# Patient Record
Sex: FEMALE | Race: WHITE | Employment: UNEMPLOYED
[De-identification: names, ages, dates, MRNs, and addresses within clinical notes are randomized per-mention and may not be internally consistent; named-entity substitution may affect disease eponyms.]

---

## 2017-06-24 ENCOUNTER — HEALTH MAINTENANCE LETTER (OUTPATIENT)
Age: 42
End: 2017-06-24

## 2017-10-03 ENCOUNTER — HOSPITAL ENCOUNTER (OUTPATIENT)
Dept: MAMMOGRAPHY | Facility: CLINIC | Age: 42
Discharge: HOME | End: 2017-10-03
Attending: OBSTETRICS & GYNECOLOGY | Admitting: OBSTETRICS & GYNECOLOGY
Payer: COMMERCIAL

## 2017-10-03 DIAGNOSIS — Z12.31 VISIT FOR SCREENING MAMMOGRAM: ICD-10-CM

## 2017-10-03 PROCEDURE — G0202 SCR MAMMO BI INCL CAD: HCPCS

## 2017-10-27 ENCOUNTER — TELEPHONE (OUTPATIENT)
Dept: CARDIOLOGY | Facility: CLINIC | Age: 42
End: 2017-10-27

## 2017-10-27 DIAGNOSIS — R00.2 PALPITATIONS: Primary | ICD-10-CM

## 2017-10-27 NOTE — TELEPHONE ENCOUNTER
Call from patient, she states for the past week on 3-4 mornings she has been awakened from a deep sleep (anywhere from 4-6 AM) with a rapid heart rate. She denies any chest discomfort, dyspnea or lightheadedness with this. She states the rate lasts 10-30 minutes, she just reads and rests until it goes away. She wonders if she needs a monitor for this. Patient is scheduled for annual visit 11/21/17 with Dr. Ortiz, having an echo 11/13/17 to monitor known aortic stenosis. Reviewed with patient to seek ER assessment if symptoms progress to chest discomfort, dyspnea or lightheadedness. Patient verbalized understanding and agreed with plan.  Will message Dr. Ortiz to review

## 2017-10-27 NOTE — TELEPHONE ENCOUNTER
Contacted patient with Dr. Ortiz's recommendation for 48 hr holter, connected patient to scheduling. Order placed for 48hr holter

## 2017-10-31 ENCOUNTER — PRE VISIT (OUTPATIENT)
Dept: CARDIOLOGY | Facility: CLINIC | Age: 42
End: 2017-10-31

## 2017-11-01 ENCOUNTER — HOSPITAL ENCOUNTER (OUTPATIENT)
Dept: CARDIOLOGY | Facility: CLINIC | Age: 42
Discharge: HOME | End: 2017-11-01
Attending: INTERNAL MEDICINE | Admitting: INTERNAL MEDICINE
Payer: COMMERCIAL

## 2017-11-01 DIAGNOSIS — R00.2 PALPITATIONS: ICD-10-CM

## 2017-11-01 PROCEDURE — 93226 XTRNL ECG REC<48 HR SCAN A/R: CPT | Performed by: INTERNAL MEDICINE

## 2017-11-01 PROCEDURE — 93227 XTRNL ECG REC<48 HR R&I: CPT | Performed by: INTERNAL MEDICINE

## 2017-11-13 ENCOUNTER — DOCUMENTATION ONLY (OUTPATIENT)
Dept: CARDIOLOGY | Facility: CLINIC | Age: 42
End: 2017-11-13

## 2017-11-13 ENCOUNTER — HOSPITAL ENCOUNTER (OUTPATIENT)
Dept: CARDIOLOGY | Facility: CLINIC | Age: 42
Discharge: HOME | End: 2017-11-13
Attending: INTERNAL MEDICINE | Admitting: INTERNAL MEDICINE
Payer: COMMERCIAL

## 2017-11-13 PROCEDURE — 93306 TTE W/DOPPLER COMPLETE: CPT | Mod: 26 | Performed by: INTERNAL MEDICINE

## 2017-11-13 PROCEDURE — 93306 TTE W/DOPPLER COMPLETE: CPT

## 2017-11-13 NOTE — PROGRESS NOTES
Echo results noted 11/13/17:  The aortic valve is not well visualized.  Moderate valvular aortic stenosis.  No aortic regurgitation is present.  Left ventricular systolic function is normal.  In comparison to previous study, 4/2015, aortic gradients have increased  Slightly.  Will message Dr. Ortiz for review.

## 2017-11-21 ENCOUNTER — OFFICE VISIT (OUTPATIENT)
Dept: CARDIOLOGY | Facility: CLINIC | Age: 42
End: 2017-11-21
Attending: INTERNAL MEDICINE
Payer: COMMERCIAL

## 2017-11-21 VITALS
DIASTOLIC BLOOD PRESSURE: 84 MMHG | BODY MASS INDEX: 31.65 KG/M2 | HEART RATE: 66 BPM | WEIGHT: 190 LBS | SYSTOLIC BLOOD PRESSURE: 140 MMHG | HEIGHT: 65 IN

## 2017-11-21 DIAGNOSIS — I35.0 NONRHEUMATIC AORTIC VALVE STENOSIS: ICD-10-CM

## 2017-11-21 DIAGNOSIS — I35.9 AORTIC VALVE DISORDER: Primary | ICD-10-CM

## 2017-11-21 DIAGNOSIS — I10 BENIGN ESSENTIAL HYPERTENSION: ICD-10-CM

## 2017-11-21 PROCEDURE — 99213 OFFICE O/P EST LOW 20 MIN: CPT | Performed by: INTERNAL MEDICINE

## 2017-11-21 NOTE — LETTER
11/21/2017    Maren Robles PA-C  [address]  [facility] [address]    RE: Mo Castaneda       Dear Colleague,    I had the pleasure of seeing Mo Castaneda in the [facility].    Mo is a very nice 42-year-old woman with past medical history significant for a bicuspid aortic valve with mild aortic stenosis.  She does have a son who has a subvalvular membrane.  We performed an MRI which demonstrates mild ectasia.  Aorta measured 36 for by 35 mm.  She had no subaortic membrane.  Transesophageal echocardiogram in 03/2013 confirmed the bicuspid aortic valve.  Again, no evidence of supravalvular or subvalvular membrane.      Mo returns to clinic stating she is doing quite well.  She has no chest, arm, neck, jaw or shoulder discomfort.  No dyspnea on exertion, orthopnea or PND.  No palpitations, lightheadedness, dizziness, syncope or near-syncope.  No ankle edema.  Overall, she feels she is doing quite well.      She does have some concerns about high blood pressures as it runs in her family.     Outpatient Encounter Prescriptions as of 11/21/2017   Medication Sig Dispense Refill     Multiple Vitamin (MULTIVITAMINS PO) Take 1 tablet by mouth daily.       Cholecalciferol (VITAMIN D3 PO) Take 1,000 Units by mouth daily.       No facility-administered encounter medications on file as of 11/21/2017.       ASSESSMENT AND PLAN:  Mo appears to be doing quite well from a cardiac standpoint without clinical evidence of ischemia, heart failure or significant arrhythmia.      A repeat echocardiogram performed earlier this month shows an ejection fraction of 60%-65%.  Mean gradient across the valve is now 24 mmHg with a peak of 41 mmHg, giving an estimated aortic valve area 1.1.  Mean gradient is up from 18 in 2015.      Blood pressure is high today at 140/84, although all other blood pressures are in the 120-130 range.  We talked about the recent recommendations that high  blood pressure is now in the 130s.  We also talked about nonmedicinal things to treat her blood pressure including regular exercise, weight loss, low salt diet, a diet high in fresh fruits and vegetables and fiber.  I have told her to check her blood pressure periodically on her own.  If it does stay high, then we do need to treat it.  It does run in her family, so she is concerned.      I will repeat the echocardiogram in 2 years.  I will have her follow up with my JOANIE in 1 year.  If she should have any problems, I would be glad to see her sooner.        Thank you for allowing me to participate in her care.     Sincerely,    Jb Ortiz MD     [facility]

## 2017-11-21 NOTE — PROGRESS NOTES
HPI and Plan:   See dictation    Orders Placed This Encounter   Procedures     Follow-Up with Cardiac Advanced Practice Provider     Follow-Up with Cardiologist       No orders of the defined types were placed in this encounter.      There are no discontinued medications.      Encounter Diagnoses   Name Primary?     Aortic valve disorder Yes     Nonrheumatic aortic valve stenosis      Benign essential hypertension        CURRENT MEDICATIONS:  Current Outpatient Prescriptions   Medication Sig Dispense Refill     Multiple Vitamin (MULTIVITAMINS PO) Take 1 tablet by mouth daily.       Cholecalciferol (VITAMIN D3 PO) Take 1,000 Units by mouth daily.         ALLERGIES     Allergies   Allergen Reactions     No Known Drug Allergy        PAST MEDICAL HISTORY:  Past Medical History:   Diagnosis Date     Aortic stenosis 2015     Aortic valve disorders     Bicuspid aortic valve     Congenital insufficiency of aortic valve-BICUSPID 2015      (normal spontaneous vaginal delivery)     x1     PIH (pregnancy induced hypertension)     delivered twins at 37 weeks       PAST SURGICAL HISTORY:  Past Surgical History:   Procedure Laterality Date     C/SECTION, CLASSICAL         FAMILY HISTORY:  Family History   Problem Relation Age of Onset     Lupus Father      Other - See Comments Father 63     gallbladder issues     HEART DISEASE Paternal Grandfather      valve     Other - See Comments Paternal Grandfather      menieres     Other - See Comments Paternal Grandmother      MG     Thyroid Disease Paternal Grandmother      Other - See Comments Paternal Aunt      RA       SOCIAL HISTORY:  Social History     Social History     Marital status:      Spouse name: N/A     Number of children: N/A     Years of education: N/A     Social History Main Topics     Smoking status: Never Smoker     Smokeless tobacco: Never Used     Alcohol use Yes      Comment: occasional     Drug use: None     Sexual activity: Not Asked     Other  "Topics Concern     Parent/Sibling W/ Cabg, Mi Or Angioplasty Before 65f 55m? No     Caffeine Concern No     Sleep Concern No     Special Diet No     Exercise Yes     treadmill daily, biking     Bike Helmet Yes     Seat Belt Yes     Social History Narrative    , has 11yo son and 8.6 yo twins     Homemaker.       Review of Systems:  Skin:  Negative       Eyes:  Negative      ENT:  Negative      Respiratory:  Negative       Cardiovascular:  Negative palpitations;Positive for;chest pain    Gastroenterology: Negative      Genitourinary:  Negative      Musculoskeletal:  Negative      Neurologic:  Negative      Psychiatric:  Negative      Heme/Lymph/Imm:  Negative      Endocrine:  Negative        Physical Exam:  Vitals: /84  Pulse 66  Ht 1.651 m (5' 5\")  Wt 86.2 kg (190 lb)  BMI 31.62 kg/m2    Constitutional:  cooperative, alert and oriented, well developed, well nourished, in no acute distress overweight      Skin:  warm and dry to the touch, no apparent skin lesions or masses noted          Head:  normocephalic, no masses or lesions        Eyes:  pupils equal and round;conjunctivae and lids unremarkable;EOMS intact        Lymph:      ENT:  no pallor or cyanosis, dentition good        Neck:  carotid pulses are full and equal bilaterally        Respiratory:  normal breath sounds, clear to auscultation, normal A-P diameter, normal symmetry, normal respiratory excursion, no use of accessory muscles         Cardiac: regular rhythm       early systolic murmur;grade 2;RUSB;radiation to the carotid        pulses full and equal                                        GI:           Extremities and Muscular Skeletal:  no edema;no spinal abnormalities noted;normal muscle strength and tone              Neurological:  no gross motor deficits;affect appropriate        Psych:  affect appropriate, oriented to time, person and place        CC  Jb Ortiz MD  [address]  [place] [id number]              "

## 2017-11-21 NOTE — PROGRESS NOTES
HISTORY OF PRESENT ILLNESS:  Mo is a very nice 42-year-old woman with past medical history significant for a bicuspid aortic valve with mild aortic stenosis.  She does have a son who has a subvalvular membrane.  We performed an MRI which demonstrates mild ectasia.  Aorta measured 36 for by 35 mm.  She had no subaortic membrane.  Transesophageal echocardiogram in 03/2013 confirmed the bicuspid aortic valve.  Again, no evidence of supravalvular or subvalvular membrane.      Mo returns to clinic stating she is doing quite well.  She has no chest, arm, neck, jaw or shoulder discomfort.  No dyspnea on exertion, orthopnea or PND.  No palpitations, lightheadedness, dizziness, syncope or near-syncope.  No ankle edema.  Overall, she feels she is doing quite well.      She does have some concerns about high blood pressures as it runs in her family.      ASSESSMENT AND PLAN:  Mo appears to be doing quite well from a cardiac standpoint without clinical evidence of ischemia, heart failure or significant arrhythmia.      A repeat echocardiogram performed earlier this month shows an ejection fraction of 60%-65%.  Mean gradient across the valve is now 24 mmHg with a peak of 41 mmHg, giving an estimated aortic valve area 1.1.  Mean gradient is up from 18 in 2015.      Blood pressure is high today at 140/84, although all other blood pressures are in the 120-130 range.  We talked about the recent recommendations that high blood pressure is now in the 130s.  We also talked about nonmedicinal things to treat her blood pressure including regular exercise, weight loss, low salt diet, a diet high in fresh fruits and vegetables and fiber.  I have told her to check her blood pressure periodically on her own.  If it does stay high, then we do need to treat it.  It does run in her family, so she is concerned.      I will repeat the echocardiogram in 2 years.  I will have her follow up with my JOANIE in 1 year.  If she should have any  problems, I would be glad to see her sooner.        Thank you for allowing me to participate in her care.         SHELIA FRANCISCO MD, [facility]             D: 2017 16:29   T: 2017 17:51   MT: ANASTASIA      Name:     SON ZUNIGA   MRN:      [id number]        Account:      [id number]   :      1975           Service Date: 2017      Document: [id number]

## 2017-11-21 NOTE — MR AVS SNAPSHOT
"              After Visit Summary   11/21/2017    Mo Castaneda    MRN: [id number]           Patient Information     Date Of Birth          1975        Visit Information        Provider Department      11/21/2017 3:45 PM Jb Ortiz MD [facility]        Today's Diagnoses     Aortic valve disorder    -  1    Nonrheumatic aortic valve stenosis        Benign essential hypertension           Follow-ups after your visit        Additional Services     Follow-Up with Cardiac Advanced Practice Provider           Follow-Up with Cardiologist       With an echocardiogram                  Future tests that were ordered for you today     Open Future Orders        Priority Expected Expires Ordered    Follow-Up with Cardiac Advanced Practice Provider Routine 11/21/2018 11/22/2018 11/21/2017    Follow-Up with Cardiologist Routine 11/21/2019 12/11/2019 11/21/2017            Who to contact     If you have questions or need follow up information about today's clinic visit or your schedule please contact [facility] directly at [phone].  Normal or non-critical lab and imaging results will be communicated to you by [employer], letter or phone within 4 business days after the clinic has received the results. If you do not hear from us within 7 days, please contact the clinic through [employer] or phone. If you have a critical or abnormal lab result, we will notify you by phone as soon as possible.  Submit refill requests through [employer] or call your pharmacy and they will forward the refill request to us. Please allow 3 business days for your refill to be completed.          Additional Information About Your Visit        [employer] Information     [employer] lets you send messages to your doctor, view your test results, renew your prescriptions, schedule appointments and more. To sign up, go to [email or web address] . Click on \"Log in\" on the left " "side of the screen, which will take you to the Welcome page. Then click on \"Sign up Now\" on the right side of the page.     You will be asked to enter the access code listed below, as well as some personal information. Please follow the directions to create your username and password.     Your access code is: [email or web address]  Expires: 2018  4:29 PM     Your access code will  in 90 days. If you need help or a new code, please call your [facility] clinic or [phone].        Care EveryWhere ID     This is your Care EveryWhere ID. This could be used by other organizations to access your [facility] medical records  [id number]        Your Vitals Were     Pulse Height BMI (Body Mass Index)             66 1.651 m (5' 5\") 31.62 kg/m2          Blood Pressure from Last 3 Encounters:   17 140/84   16 124/74   05/12/15 136/84    Weight from Last 3 Encounters:   17 86.2 kg (190 lb)   16 82.1 kg (181 lb)   05/12/15 87.1 kg (192 lb)              We Performed the Following     Follow-Up with Cardiologist        Primary Care Provider Office Phone # Fax #    Maren Robles PA-C [phone] [address] [facility] [address]  [facility] [address]        Equal Access to Services     [facility]: Hadii aad ku hadasho Soomaali, waaxda luqadaha, qaybta kaalmada adeegyada, carla pickering haylatosha [place] . So [facility] [phone].    ATENCIÓN: Si habla español, tiene a kong disposición servicios gratuitos de asistencia lingüística. Llame al [phone].    We comply with applicable federal civil rights laws and [place] laws. We do not discriminate on the basis of race, color, national origin, age, disability, sex, sexual orientation, or gender identity.            Thank you!     Thank you for choosing [facility]  for your care. Our goal is always to provide you with excellent care. Hearing back from our patients is one way we can continue to improve our " services. Please take a few minutes to complete the written survey that you may receive in the mail after your visit with us. Thank you!             Your Updated Medication List - Protect others around you: Learn how to safely use, store and throw away your medicines at www.disposemymeds.org.          This list is accurate as of: 11/21/17  4:29 PM.  Always use your most recent med list.                   Brand Name Dispense Instructions for use Diagnosis    MULTIVITAMINS PO      Take 1 tablet by mouth daily.        VITAMIN D3 PO      Take 1,000 Units by mouth daily.

## 2020-02-13 ENCOUNTER — TELEPHONE (OUTPATIENT)
Dept: CARDIOLOGY | Facility: CLINIC | Age: 45
End: 2020-02-13

## 2020-02-13 DIAGNOSIS — I35.0 AORTIC STENOSIS: Primary | ICD-10-CM

## 2020-02-13 NOTE — TELEPHONE ENCOUNTER
Message to Dr Ortiz- OK to order echo? Pt overdue for cardiology f/up, last visit 11/2017 mentioned surveillance echo every two years for bicuspid aortic valve.

## 2020-02-24 ENCOUNTER — HOSPITAL ENCOUNTER (OUTPATIENT)
Dept: MAMMOGRAPHY | Facility: CLINIC | Age: 45
Discharge: HOME | End: 2020-02-24
Attending: OBSTETRICS & GYNECOLOGY | Admitting: OBSTETRICS & GYNECOLOGY
Payer: COMMERCIAL

## 2020-02-24 DIAGNOSIS — Z12.31 VISIT FOR SCREENING MAMMOGRAM: ICD-10-CM

## 2020-02-24 PROCEDURE — 77067 SCR MAMMO BI INCL CAD: CPT

## 2020-03-13 ENCOUNTER — HOSPITAL ENCOUNTER (OUTPATIENT)
Dept: CARDIOLOGY | Facility: CLINIC | Age: 45
Discharge: HOME | End: 2020-03-13
Attending: INTERNAL MEDICINE | Admitting: INTERNAL MEDICINE
Payer: COMMERCIAL

## 2020-03-13 ENCOUNTER — DOCUMENTATION ONLY (OUTPATIENT)
Dept: CARDIOLOGY | Facility: CLINIC | Age: 45
End: 2020-03-13

## 2020-03-13 DIAGNOSIS — I77.810 ASCENDING AORTA DILATATION (H): ICD-10-CM

## 2020-03-13 DIAGNOSIS — I35.0 AORTIC STENOSIS: ICD-10-CM

## 2020-03-13 PROCEDURE — 93306 TTE W/DOPPLER COMPLETE: CPT | Mod: 26 | Performed by: INTERNAL MEDICINE

## 2020-03-13 PROCEDURE — 93306 TTE W/DOPPLER COMPLETE: CPT

## 2020-03-13 NOTE — PROGRESS NOTES
"Echo 3/13/2020 noted. Ordered for 2 year follow up of known mild aortic stenosis. Patient is overdue for follow up and has not seen cardiology or PCP since 11/2017.  Patient to see Dr. Ortiz on 4/28/2020.    Echo: Left ventricular systolic function is normal. The visual ejection fraction is estimated at 60-65%. Moderate to severe valvular aortic stenosis. The ascending aorta is Mildly dilated. 4.0cm. Compared to prior study, changes are noted.  VIPIN = 1.1cm2 (same as 2017)  Ao mean GP: 34.8 mmHg (was 23 in 2017)    Attempted to contact patient for symptom update, left a message for patient to call back.    Will message Dr. Ortiz to review    1540 patient called back. She states she is feeling well, no fatigue, no SOB, no lightheadedness. She states that occasionally she wakes up at night with a fast heart rate but this resolves once she is up. Occasionally she feels \"twinges\" in her breast and/or center chest.     3/16/2020 reply from Dr. Ortiz:  Thank you        "

## 2020-04-15 ENCOUNTER — PRE VISIT (OUTPATIENT)
Dept: CARDIOLOGY | Facility: CLINIC | Age: 45
End: 2020-04-15

## 2020-04-28 ENCOUNTER — VIRTUAL VISIT (OUTPATIENT)
Dept: CARDIOLOGY | Facility: CLINIC | Age: 45
End: 2020-04-28
Attending: INTERNAL MEDICINE
Payer: COMMERCIAL

## 2020-04-28 DIAGNOSIS — I77.810 ASCENDING AORTA DILATATION (H): ICD-10-CM

## 2020-04-28 DIAGNOSIS — I35.0 NONRHEUMATIC AORTIC VALVE STENOSIS: Primary | ICD-10-CM

## 2020-04-28 DIAGNOSIS — Q23.81 BICUSPID AORTIC VALVE: ICD-10-CM

## 2020-04-28 PROCEDURE — 99213 OFFICE O/P EST LOW 20 MIN: CPT | Mod: 95 | Performed by: INTERNAL MEDICINE

## 2020-04-28 NOTE — LETTER
4/28/2020      RE: Mo Castaneda  [address] [place] [address]       Dear Colleague,    Thank you for the opportunity to participate in the care of your patient, Mo Castaneda, at the [facility]. Please see a copy of my visit note below.    HISTORY OF PRESENT ILLNESS:  Mo is a very nice 45-year-old woman with past medical history significant for a bicuspid aortic valve with mild aortic stenosis.  She does have a son who has a subvalvular membrane.  We performed an MRI which demonstrates mild ectasia.  Aorta measured 36 for by 35 mm.  She had no subaortic membrane.  Transesophageal echocardiogram in 03/2013 confirmed the bicuspid aortic valve.  Again, no evidence of supravalvular or subvalvular membrane.     Mo relates that her son is potentially scheduled for surgery for subaortic membrane on June 1.    She states she has no symptoms to suggest a Covid infection.     I had a video visit with Mo today.  She states she works out almost daily either doing her stairmaster or 1 hour walk with her .  Neither cause her any symptoms.      She does have some concerns about high blood pressures as it runs in her family.  We rechecked her blood pressure today and was 124/77.  She states the vast majority of time her blood pressure is in the 120s.  Occasionally she has a spike.    General:  no apparent distress, normal body habitus, sitting upright.  ENT/Mouth:  membranes moist, no nasal discharge.  Normal head shape, no apparent injury or laceration.  Eyes:  no scleral icterus, normal conjunctivae.  No observed jaundice.  Neck:  no apparent neck swelling.   Chest/Lungs:  No breathing difficulty while speaking.  No audible wheezing.  No cough during conversation.  Cardiovascular:  No obviously elevated jugular venous pressure.    Extremities:  no apparent cyanosis.  Skin:  no xanthelasma.  No facial  lacerations.  Neurologic:  Normal arm motion bilateral, no tremors.    Psychiatric:  Alert and oriented x3, calm demeanor    The rest of the comprehensive physical examination is deferred due to public health emergency video visit restrictions.      ASSESSMENT AND PLAN:  Mo appears to be doing quite well from a cardiac standpoint without clinical evidence of ischemia, heart failure or significant arrhythmia.      Repeat echocardiogram shows progression of her aortic stenosis.  Mean gradient is now 35 with a peak gradient of 53.  Her aortic valve area is 1.1 with a dimensionless index of 0.36.  Aortic root is measured at 4.0 by echocardiogram.  I will start following her echocardiograms on an annual basis.  We discussed the importance of watching for any dyspnea on exertion, exertional chest pain or unexplained lightheadedness or dizziness     Initial blood pressure with the MA was quite high although states she was nervous and anxious about the video visit.  A repeat of her blood pressure is 124/77 and more typical with her routine checks.     We talked about things she could do to non-medicinally treated blood pressure including low-salt diet, diet high in fresh fruits and vegetables to increase her potassium and magnesium.  Continue her regular exercise and maintain ideal body weight.    Thank you for allowing me to participate in her care.     Please do not hesitate to contact me if you have any questions/concerns.     Sincerely,     Jb Ortiz MD

## 2020-04-28 NOTE — PROGRESS NOTES
"Mo Castaneda is a 45 year old female who is being evaluated via a billable video visit.      The patient has been notified of following:     \"This video visit will be conducted via a call between you and your physician/provider. We have found that certain health care needs can be provided without the need for an in-person physical exam.  This service lets us provide the care you need with a video conversation.  If a prescription is necessary we can send it directly to your pharmacy.  If lab work is needed we can place an order for that and you can then stop by our lab to have the test done at a later time.    Video visits are billed at different rates depending on your insurance coverage.  Please reach out to your insurance provider with any questions.    If during the course of the call the physician/provider feels a video visit is not appropriate, you will not be charged for this service.\"  BP: 161/99  HR: 83  Weight:  Gillian Dawkins LPN    Review Of Systems  Skin: NEGATIVE  Eyes:Ears/Nose/Throat: NEGATIVE  Respiratory: NEGATIVE  Cardiovascular:ache in chest, occ., heart racing at night  Gastrointestinal: NEGATIVE  Genitourinary:NEGATIVE   Musculoskeletal: NEGATIVE  Neurologic: NEGATIVE  Psychiatric: anxiety, stress - son having heart surgery this summer for sub aortic membrane  Hematologic/Lymphatic/Immunologic: NEGATIVE  Endocrine:  NEGATIVE    Patient has given verbal consent for Video visit? Yes  How would you like to obtain your AVS? Sondra  Patient would like the video invitation sent by: Text to cell phone: [phone]    Will anyone else be joining your video visit? No        Video-Visit Details    Type of service:  Video Visit    Video Start Time: 2:47 PM  Video End Time: 3:08 PM    Originating Location (pt. Location): Home    Distant Location (provider location):  [facility]     Mode of Communication:  Video Conference via [facility]    HISTORY OF PRESENT " ILLNESS:  Mo is a very nice 45-year-old woman with past medical history significant for a bicuspid aortic valve with mild aortic stenosis.  She does have a son who has a subvalvular membrane.  We performed an MRI which demonstrates mild ectasia.  Aorta measured 36 for by 35 mm.  She had no subaortic membrane.  Transesophageal echocardiogram in 03/2013 confirmed the bicuspid aortic valve.  Again, no evidence of supravalvular or subvalvular membrane.     Mo relates that her son is potentially scheduled for surgery for subaortic membrane on June 1.    She states she has no symptoms to suggest a Covid infection.     I had a video visit with Mo today.  She states she works out almost daily either doing her stairmaster or 1 hour walk with her .  Neither cause her any symptoms.      She does have some concerns about high blood pressures as it runs in her family.  We rechecked her blood pressure today and was 124/77.  She states the vast majority of time her blood pressure is in the 120s.  Occasionally she has a spike.    General:  no apparent distress, normal body habitus, sitting upright.  ENT/Mouth:  membranes moist, no nasal discharge.  Normal head shape, no apparent injury or laceration.  Eyes:  no scleral icterus, normal conjunctivae.  No observed jaundice.  Neck:  no apparent neck swelling.   Chest/Lungs:  No breathing difficulty while speaking.  No audible wheezing.  No cough during conversation.  Cardiovascular:  No obviously elevated jugular venous pressure.    Extremities:  no apparent cyanosis.  Skin:  no xanthelasma.  No facial lacerations.  Neurologic:  Normal arm motion bilateral, no tremors.    Psychiatric:  Alert and oriented x3, calm demeanor    The rest of the comprehensive physical examination is deferred due to public health emergency video visit restrictions.         ASSESSMENT AND PLAN:  Mo appears to be doing quite well from a cardiac standpoint without clinical evidence of  ischemia, heart failure or significant arrhythmia.      Repeat echocardiogram shows progression of her aortic stenosis.  Mean gradient is now 35 with a peak gradient of 53.  Her aortic valve area is 1.1 with a dimensionless index of 0.36.  Aortic root is measured at 4.0 by echocardiogram.  I will start following her echocardiograms on an annual basis.  We discussed the importance of watching for any dyspnea on exertion, exertional chest pain or unexplained lightheadedness or dizziness     Initial blood pressure with the MA was quite high although states she was nervous and anxious about the video visit.  A repeat of her blood pressure is 124/77 and more typical with her routine checks.     We talked about things she could do to non-medicinally treated blood pressure including low-salt diet, diet high in fresh fruits and vegetables to increase her potassium and magnesium.  Continue her regular exercise and maintain ideal body weight.      Thank you for allowing me to participate in her care.         JB FRANCISCO MD, [facility]        Jb Francisco MD

## 2020-06-18 ENCOUNTER — TRANSFERRED RECORDS (OUTPATIENT)
Dept: MULTI SPECIALTY CLINIC | Facility: CLINIC | Age: 45
End: 2020-06-18

## 2020-06-18 LAB — PAP SMEAR - HIM PATIENT REPORTED: NORMAL

## 2021-01-15 ENCOUNTER — HEALTH MAINTENANCE LETTER (OUTPATIENT)
Age: 46
End: 2021-01-15

## 2021-03-18 ENCOUNTER — MYC MEDICAL ADVICE (OUTPATIENT)
Dept: CARDIOLOGY | Facility: CLINIC | Age: 46
End: 2021-03-18

## 2021-03-24 ENCOUNTER — PRE VISIT (OUTPATIENT)
Dept: CARDIOLOGY | Facility: CLINIC | Age: 46
End: 2021-03-24

## 2021-03-25 ENCOUNTER — IMMUNIZATION (OUTPATIENT)
Dept: NURSING | Facility: CLINIC | Age: 46
End: 2021-03-25
Payer: COMMERCIAL

## 2021-03-25 PROCEDURE — 91300 PR COVID VAC PFIZER DIL RECON 30 MCG/0.3 ML IM: CPT

## 2021-03-25 PROCEDURE — 0001A PR COVID VAC PFIZER DIL RECON 30 MCG/0.3 ML IM: CPT

## 2021-03-30 ENCOUNTER — HOSPITAL ENCOUNTER (OUTPATIENT)
Dept: CARDIOLOGY | Facility: CLINIC | Age: 46
Discharge: HOME | End: 2021-03-30
Attending: INTERNAL MEDICINE | Admitting: INTERNAL MEDICINE
Payer: COMMERCIAL

## 2021-03-30 DIAGNOSIS — I35.0 NONRHEUMATIC AORTIC VALVE STENOSIS: ICD-10-CM

## 2021-03-30 PROCEDURE — 93306 TTE W/DOPPLER COMPLETE: CPT

## 2021-03-30 PROCEDURE — 93306 TTE W/DOPPLER COMPLETE: CPT | Mod: 26 | Performed by: INTERNAL MEDICINE

## 2021-04-06 ENCOUNTER — OFFICE VISIT (OUTPATIENT)
Dept: CARDIOLOGY | Facility: CLINIC | Age: 46
End: 2021-04-06
Attending: INTERNAL MEDICINE
Payer: COMMERCIAL

## 2021-04-06 VITALS
BODY MASS INDEX: 28.16 KG/M2 | WEIGHT: 169 LBS | HEART RATE: 77 BPM | SYSTOLIC BLOOD PRESSURE: 150 MMHG | DIASTOLIC BLOOD PRESSURE: 97 MMHG | HEIGHT: 65 IN

## 2021-04-06 DIAGNOSIS — I77.810 ASCENDING AORTA DILATATION (H): ICD-10-CM

## 2021-04-06 DIAGNOSIS — Q23.81 BICUSPID AORTIC VALVE: Primary | ICD-10-CM

## 2021-04-06 DIAGNOSIS — I35.0 NONRHEUMATIC AORTIC VALVE STENOSIS: ICD-10-CM

## 2021-04-06 PROCEDURE — 99213 OFFICE O/P EST LOW 20 MIN: CPT | Performed by: INTERNAL MEDICINE

## 2021-04-06 ASSESSMENT — MIFFLIN-ST. JEOR: SCORE: 1402.46

## 2021-04-06 NOTE — LETTER
2021    Maren Robles PA-C  [address]  [facility] [address]    RE: Mo Castaneda       Dear Colleague,    I had the pleasure of seeing Mo Castaneda in the [facility].    HPI and Plan:   See dictation    Orders Placed This Encounter   Procedures     Follow-Up with Cardiologist     Echocardiogram Complete       No orders of the defined types were placed in this encounter.      There are no discontinued medications.      Encounter Diagnoses   Name Primary?     Bicuspid aortic valve Yes     Nonrheumatic aortic valve stenosis      Ascending aorta dilatation (H)        CURRENT MEDICATIONS:  Current Outpatient Medications   Medication Sig Dispense Refill     Cholecalciferol (VITAMIN D3 PO) Take 1,000 Units by mouth daily.       Multiple Vitamin (MULTIVITAMINS PO) Take 1 tablet by mouth daily.         ALLERGIES     Allergies   Allergen Reactions     No Known Drug Allergy        PAST MEDICAL HISTORY:  Past Medical History:   Diagnosis Date     Aortic stenosis 2015     Aortic valve disorders     Bicuspid aortic valve     Ascending aorta dilatation (H)      Congenital insufficiency of aortic valve-BICUSPID 2015      (normal spontaneous vaginal delivery)     x1     PIH (pregnancy induced hypertension)     delivered twins at 37 weeks       PAST SURGICAL HISTORY:  Past Surgical History:   Procedure Laterality Date     C/SECTION, CLASSICAL         FAMILY HISTORY:  Family History   Problem Relation Age of Onset     Lupus Father      Other - See Comments Father 63        gallbladder issues     Hypertension Father      Heart Disease Paternal Grandfather         valve     Other - See Comments Paternal Grandfather         menieres     Other - See Comments Paternal Grandmother         MG     Thyroid Disease Paternal Grandmother      Other - See Comments Paternal Aunt         RA       SOCIAL HISTORY:  Social History     Socioeconomic History      Marital status:      Spouse name: None     Number of children: None     Years of education: None     Highest education level: None   Occupational History     None   Social Needs     Financial resource strain: None     Food insecurity     Worry: None     Inability: None     Transportation needs     Medical: None     Non-medical: None   Tobacco Use     Smoking status: Never Smoker     Smokeless tobacco: Never Used   Substance and Sexual Activity     Alcohol use: Yes     Comment: occasional     Drug use: None     Sexual activity: None   Lifestyle     Physical activity     Days per week: None     Minutes per session: None     Stress: None   Relationships     Social connections     Talks on phone: None     Gets together: None     Attends Alevism service: None     Active member of club or organization: None     Attends meetings of clubs or organizations: None     Relationship status: None     Intimate partner violence     Fear of current or ex partner: None     Emotionally abused: None     Physically abused: None     Forced sexual activity: None   Other Topics Concern     Parent/sibling w/ CABG, MI or angioplasty before 65F 55M? No      Service Not Asked     Blood Transfusions Not Asked     Caffeine Concern No     Occupational Exposure Not Asked     Hobby Hazards Not Asked     Sleep Concern No     Stress Concern Not Asked     Weight Concern Not Asked     Special Diet No     Back Care Not Asked     Exercise Yes     Comment: treadmill daily, biking     Bike Helmet Yes     Seat Belt Yes     Self-Exams Not Asked   Social History Narrative    , has 11yo son and 8.6 yo twins     Homemaker.       Review of Systems:  Skin:  Negative       Eyes:  Negative      ENT:  Negative      Respiratory:  Negative       Cardiovascular:    Positive for;chest pain;palpitations    Gastroenterology: Negative      Genitourinary:  Negative      Musculoskeletal:  Negative      Neurologic:  Negative      Psychiatric:   "Negative      Heme/Lymph/Imm:  Negative      Endocrine:  Negative        Physical Exam:  Vitals: BP (!) 150/97 (BP Location: Left arm, Patient Position: Sitting, Cuff Size: Adult Regular)   Pulse 77   Ht 1.651 m (5' 5\")   Wt 76.7 kg (169 lb)   BMI 28.12 kg/m      Constitutional:  cooperative, alert and oriented, well developed, well nourished, in no acute distress overweight      Skin:  warm and dry to the touch, no apparent skin lesions or masses noted          Head:  normocephalic, no masses or lesions        Eyes:  pupils equal and round, conjunctivae and lids unremarkable, sclera white, no xanthalasma, EOMS intact, no nystagmus        Lymph:      ENT:  no pallor or cyanosis, dentition good        Neck:  carotid pulses are full and equal bilaterally        Respiratory:  normal breath sounds, clear to auscultation, normal A-P diameter, normal symmetry, normal respiratory excursion, no use of accessory muscles         Cardiac: regular rhythm       grade 2;RUSB;radiation to the carotid;systolic murmur     mid peaking  pulses full and equal                                        GI:           Extremities and Muscular Skeletal:  no edema;no spinal abnormalities noted;normal muscle strength and tone              Neurological:  no gross motor deficits        Psych:  affect appropriate, oriented to time, person and place        Thank you for allowing me to participate in the care of your patient.      Sincerely,     Jb Ortiz MD     [facility]    cc:   Jb Ortiz MD  [address] [place] [id number]        "

## 2021-04-06 NOTE — PROGRESS NOTES
Service Date: 04/06/2021      CLINIC VISIT      HISTORY OF PRESENT ILLNESS:  Mo is a very nice 45-year-old woman with past medical history significant for a bicuspid aortic valve with mild aortic stenosis.  She has a son who, just this past June, underwent surgery for his subvalvular membrane.  Mo had a TALHA in 2013 confirming her bicuspid aortic valve and again documenting that she has no subvalvular membrane.  She also had an MRI at that time, also demonstrating no evidence of subaortic membrane and her ascending aorta was 3.6 x 3.5.      Echocardiogram last year appeared to progress to the point that her mean gradient across the valve was 35 and ascending aorta was 4.0.  I decided to start checking her echos on an annual basis.      Mo returns to clinic stating she is doing great.  She states she probably exercises about 2-1/2 hours a day.  She walks her dogs for an hour and a half, then oftentimes will do her own workout for at least a half hour to an hour and she has no symptoms at all.  She states she occasionally gets a chest discomfort.  This occurs at rest and it is most closely associated with stress.  She states she never has any chest discomfort or limitations with her activities.      ASSESSMENT AND PLAN:  Mo appears to be doing well from a cardiac standpoint without clinical evidence of ischemia, heart failure or significant arrhythmia.      Echocardiogram this year estimates ejection fraction of 60%-65%, mean gradient of 25, aortic valve area of 0.97, dimensionless index of 0.34.  Her ascending aorta is estimated to be 3.9.  Although these numbers are similar or better than last year's numbers, I told her we will repeat the echocardiogram again next year.  If numbers are in a similar range, I may extend back out to doing echos every 2 years.      We talked a bit about TAVR, about options of tissue valve versus metal valve and I have told her this is a rapidly evolving area and at this  time, we are just going to continue to follow her by symptoms and serial echocardiograms.      I have told her that her chest pain is quite atypical.  I do not think it represents angina nor do I think it is related to her aortic valve.  We will plan on visiting again next year.  If she should have any problems, I would be glad to see her sooner.      Thank you for allowing me to participate in her care.      Jb Francisco MD, FACC         JB FRANCISCO MD, FACC             D: 2021   T: 2021   MT: BALAJI      Name:     SON ZUNIGA   MRN:      [id number]        Account:      [id number]   :      1975           Service Date: 2021      Document: [id number]

## 2021-04-06 NOTE — PROGRESS NOTES
HPI and Plan:   See dictation    Orders Placed This Encounter   Procedures     Follow-Up with Cardiologist     Echocardiogram Complete       No orders of the defined types were placed in this encounter.      There are no discontinued medications.      Encounter Diagnoses   Name Primary?     Bicuspid aortic valve Yes     Nonrheumatic aortic valve stenosis      Ascending aorta dilatation (H)        CURRENT MEDICATIONS:  Current Outpatient Medications   Medication Sig Dispense Refill     Cholecalciferol (VITAMIN D3 PO) Take 1,000 Units by mouth daily.       Multiple Vitamin (MULTIVITAMINS PO) Take 1 tablet by mouth daily.         ALLERGIES     Allergies   Allergen Reactions     No Known Drug Allergy        PAST MEDICAL HISTORY:  Past Medical History:   Diagnosis Date     Aortic stenosis 2015     Aortic valve disorders     Bicuspid aortic valve     Ascending aorta dilatation (H)      Congenital insufficiency of aortic valve-BICUSPID 2015      (normal spontaneous vaginal delivery)     x1     PIH (pregnancy induced hypertension)     delivered twins at 37 weeks       PAST SURGICAL HISTORY:  Past Surgical History:   Procedure Laterality Date     C/SECTION, CLASSICAL         FAMILY HISTORY:  Family History   Problem Relation Age of Onset     Lupus Father      Other - See Comments Father 63        gallbladder issues     Hypertension Father      Heart Disease Paternal Grandfather         valve     Other - See Comments Paternal Grandfather         menieres     Other - See Comments Paternal Grandmother         MG     Thyroid Disease Paternal Grandmother      Other - See Comments Paternal Aunt         RA       SOCIAL HISTORY:  Social History     Socioeconomic History     Marital status:      Spouse name: None     Number of children: None     Years of education: None     Highest education level: None   Occupational History     None   Social Needs     Financial resource strain: None     Food insecurity      "Worry: None     Inability: None     Transportation needs     Medical: None     Non-medical: None   Tobacco Use     Smoking status: Never Smoker     Smokeless tobacco: Never Used   Substance and Sexual Activity     Alcohol use: Yes     Comment: occasional     Drug use: None     Sexual activity: None   Lifestyle     Physical activity     Days per week: None     Minutes per session: None     Stress: None   Relationships     Social connections     Talks on phone: None     Gets together: None     Attends Sabianist service: None     Active member of club or organization: None     Attends meetings of clubs or organizations: None     Relationship status: None     Intimate partner violence     Fear of current or ex partner: None     Emotionally abused: None     Physically abused: None     Forced sexual activity: None   Other Topics Concern     Parent/sibling w/ CABG, MI or angioplasty before 65F 55M? No      Service Not Asked     Blood Transfusions Not Asked     Caffeine Concern No     Occupational Exposure Not Asked     Hobby Hazards Not Asked     Sleep Concern No     Stress Concern Not Asked     Weight Concern Not Asked     Special Diet No     Back Care Not Asked     Exercise Yes     Comment: treadmill daily, biking     Bike Helmet Yes     Seat Belt Yes     Self-Exams Not Asked   Social History Narrative    , has 9yo son and 8.4 yo twins     Homemaker.       Review of Systems:  Skin:  Negative       Eyes:  Negative      ENT:  Negative      Respiratory:  Negative       Cardiovascular:    Positive for;chest pain;palpitations    Gastroenterology: Negative      Genitourinary:  Negative      Musculoskeletal:  Negative      Neurologic:  Negative      Psychiatric:  Negative      Heme/Lymph/Imm:  Negative      Endocrine:  Negative        Physical Exam:  Vitals: BP (!) 150/97 (BP Location: Left arm, Patient Position: Sitting, Cuff Size: Adult Regular)   Pulse 77   Ht 1.651 m (5' 5\")   Wt 76.7 kg (169 lb)   BMI " 28.12 kg/m      Constitutional:  cooperative, alert and oriented, well developed, well nourished, in no acute distress overweight      Skin:  warm and dry to the touch, no apparent skin lesions or masses noted          Head:  normocephalic, no masses or lesions        Eyes:  pupils equal and round, conjunctivae and lids unremarkable, sclera white, no xanthalasma, EOMS intact, no nystagmus        Lymph:      ENT:  no pallor or cyanosis, dentition good        Neck:  carotid pulses are full and equal bilaterally        Respiratory:  normal breath sounds, clear to auscultation, normal A-P diameter, normal symmetry, normal respiratory excursion, no use of accessory muscles         Cardiac: regular rhythm       grade 2;RUSB;radiation to the carotid;systolic murmur     mid peaking  pulses full and equal                                        GI:           Extremities and Muscular Skeletal:  no edema;no spinal abnormalities noted;normal muscle strength and tone              Neurological:  no gross motor deficits        Psych:  affect appropriate, oriented to time, person and place        CC  Jb Ortiz MD  [address]  [place] [id number]

## 2021-04-15 ENCOUNTER — IMMUNIZATION (OUTPATIENT)
Dept: NURSING | Facility: CLINIC | Age: 46
End: 2021-04-15
Attending: INTERNAL MEDICINE
Payer: COMMERCIAL

## 2021-04-15 PROCEDURE — 91300 PR COVID VAC PFIZER DIL RECON 30 MCG/0.3 ML IM: CPT

## 2021-04-15 PROCEDURE — 0002A PR COVID VAC PFIZER DIL RECON 30 MCG/0.3 ML IM: CPT

## 2021-05-16 ENCOUNTER — HEALTH MAINTENANCE LETTER (OUTPATIENT)
Age: 46
End: 2021-05-16

## 2021-09-05 ENCOUNTER — HEALTH MAINTENANCE LETTER (OUTPATIENT)
Age: 46
End: 2021-09-05

## 2022-02-20 ENCOUNTER — HEALTH MAINTENANCE LETTER (OUTPATIENT)
Age: 47
End: 2022-02-20

## 2022-06-12 ENCOUNTER — HEALTH MAINTENANCE LETTER (OUTPATIENT)
Age: 47
End: 2022-06-12

## 2022-10-23 ENCOUNTER — HEALTH MAINTENANCE LETTER (OUTPATIENT)
Age: 47
End: 2022-10-23

## 2023-03-07 ENCOUNTER — OFFICE VISIT (OUTPATIENT)
Dept: FAMILY MEDICINE | Facility: CLINIC | Age: 48
End: 2023-03-07
Payer: COMMERCIAL

## 2023-03-07 VITALS
OXYGEN SATURATION: 100 % | WEIGHT: 172.3 LBS | RESPIRATION RATE: 16 BRPM | DIASTOLIC BLOOD PRESSURE: 84 MMHG | SYSTOLIC BLOOD PRESSURE: 124 MMHG | BODY MASS INDEX: 28.71 KG/M2 | TEMPERATURE: 97.6 F | HEIGHT: 65 IN | HEART RATE: 83 BPM

## 2023-03-07 DIAGNOSIS — Z12.11 SCREEN FOR COLON CANCER: ICD-10-CM

## 2023-03-07 DIAGNOSIS — Z11.4 SCREENING FOR HIV (HUMAN IMMUNODEFICIENCY VIRUS): ICD-10-CM

## 2023-03-07 DIAGNOSIS — R21 RASH AND NONSPECIFIC SKIN ERUPTION: ICD-10-CM

## 2023-03-07 DIAGNOSIS — Z12.4 CERVICAL CANCER SCREENING: ICD-10-CM

## 2023-03-07 DIAGNOSIS — Z23 NEED FOR VACCINATION: ICD-10-CM

## 2023-03-07 DIAGNOSIS — E55.9 VITAMIN D DEFICIENCY: ICD-10-CM

## 2023-03-07 DIAGNOSIS — K21.9 GASTROESOPHAGEAL REFLUX DISEASE, UNSPECIFIED WHETHER ESOPHAGITIS PRESENT: ICD-10-CM

## 2023-03-07 DIAGNOSIS — Z11.59 NEED FOR HEPATITIS C SCREENING TEST: ICD-10-CM

## 2023-03-07 DIAGNOSIS — Z00.00 ANNUAL PHYSICAL EXAM: Primary | ICD-10-CM

## 2023-03-07 DIAGNOSIS — Z13.220 SCREENING FOR HYPERLIPIDEMIA: ICD-10-CM

## 2023-03-07 LAB
ALBUMIN SERPL BCG-MCNC: 5.1 G/DL (ref 3.5–5.2)
ALP SERPL-CCNC: 58 U/L (ref 35–104)
ALT SERPL W P-5'-P-CCNC: 19 U/L (ref 10–35)
ANION GAP SERPL CALCULATED.3IONS-SCNC: 13 MMOL/L (ref 7–15)
AST SERPL W P-5'-P-CCNC: 30 U/L (ref 10–35)
BASOPHILS # BLD AUTO: 0 10E3/UL (ref 0–0.2)
BASOPHILS NFR BLD AUTO: 0 %
BILIRUB SERPL-MCNC: 0.7 MG/DL
BUN SERPL-MCNC: 11.1 MG/DL (ref 6–20)
CALCIUM SERPL-MCNC: 9.5 MG/DL (ref 8.6–10)
CHLORIDE SERPL-SCNC: 103 MMOL/L (ref 98–107)
CHOLEST SERPL-MCNC: 221 MG/DL
CREAT SERPL-MCNC: 0.82 MG/DL (ref 0.51–0.95)
DEPRECATED HCO3 PLAS-SCNC: 25 MMOL/L (ref 22–29)
EOSINOPHIL # BLD AUTO: 0.1 10E3/UL (ref 0–0.7)
EOSINOPHIL NFR BLD AUTO: 2 %
ERYTHROCYTE [DISTWIDTH] IN BLOOD BY AUTOMATED COUNT: 12.7 % (ref 10–15)
GFR SERPL CREATININE-BSD FRML MDRD: 87 ML/MIN/1.73M2
GLUCOSE SERPL-MCNC: 96 MG/DL (ref 70–99)
HCT VFR BLD AUTO: 39.1 % (ref 35–47)
HDLC SERPL-MCNC: 66 MG/DL
HGB BLD-MCNC: 12.9 G/DL (ref 11.7–15.7)
IMM GRANULOCYTES # BLD: 0 10E3/UL
IMM GRANULOCYTES NFR BLD: 0 %
LDLC SERPL CALC-MCNC: 144 MG/DL
LYMPHOCYTES # BLD AUTO: 1.1 10E3/UL (ref 0.8–5.3)
LYMPHOCYTES NFR BLD AUTO: 27 %
MCH RBC QN AUTO: 30.4 PG (ref 26.5–33)
MCHC RBC AUTO-ENTMCNC: 33 G/DL (ref 31.5–36.5)
MCV RBC AUTO: 92 FL (ref 78–100)
MONOCYTES # BLD AUTO: 0.4 10E3/UL (ref 0–1.3)
MONOCYTES NFR BLD AUTO: 10 %
NEUTROPHILS # BLD AUTO: 2.4 10E3/UL (ref 1.6–8.3)
NEUTROPHILS NFR BLD AUTO: 61 %
NONHDLC SERPL-MCNC: 155 MG/DL
PLATELET # BLD AUTO: 217 10E3/UL (ref 150–450)
POTASSIUM SERPL-SCNC: 3.9 MMOL/L (ref 3.4–5.3)
PROT SERPL-MCNC: 7.5 G/DL (ref 6.4–8.3)
RBC # BLD AUTO: 4.24 10E6/UL (ref 3.8–5.2)
SODIUM SERPL-SCNC: 141 MMOL/L (ref 136–145)
T4 FREE SERPL-MCNC: 1.14 NG/DL (ref 0.9–1.7)
TRIGL SERPL-MCNC: 55 MG/DL
TSH SERPL DL<=0.005 MIU/L-ACNC: 4.56 UIU/ML (ref 0.3–4.2)
WBC # BLD AUTO: 4 10E3/UL (ref 4–11)

## 2023-03-07 PROCEDURE — 90715 TDAP VACCINE 7 YRS/> IM: CPT | Performed by: PHYSICIAN ASSISTANT

## 2023-03-07 PROCEDURE — 80061 LIPID PANEL: CPT | Performed by: PHYSICIAN ASSISTANT

## 2023-03-07 PROCEDURE — 84439 ASSAY OF FREE THYROXINE: CPT | Performed by: PHYSICIAN ASSISTANT

## 2023-03-07 PROCEDURE — 36415 COLL VENOUS BLD VENIPUNCTURE: CPT | Performed by: PHYSICIAN ASSISTANT

## 2023-03-07 PROCEDURE — 90471 IMMUNIZATION ADMIN: CPT | Performed by: PHYSICIAN ASSISTANT

## 2023-03-07 PROCEDURE — 86803 HEPATITIS C AB TEST: CPT | Performed by: PHYSICIAN ASSISTANT

## 2023-03-07 PROCEDURE — 80050 GENERAL HEALTH PANEL: CPT | Performed by: PHYSICIAN ASSISTANT

## 2023-03-07 PROCEDURE — 87389 HIV-1 AG W/HIV-1&-2 AB AG IA: CPT | Performed by: PHYSICIAN ASSISTANT

## 2023-03-07 PROCEDURE — 99386 PREV VISIT NEW AGE 40-64: CPT | Mod: 25 | Performed by: PHYSICIAN ASSISTANT

## 2023-03-07 PROCEDURE — 99213 OFFICE O/P EST LOW 20 MIN: CPT | Mod: 25 | Performed by: PHYSICIAN ASSISTANT

## 2023-03-07 PROCEDURE — 90677 PCV20 VACCINE IM: CPT | Performed by: PHYSICIAN ASSISTANT

## 2023-03-07 PROCEDURE — 82306 VITAMIN D 25 HYDROXY: CPT | Performed by: PHYSICIAN ASSISTANT

## 2023-03-07 PROCEDURE — 90472 IMMUNIZATION ADMIN EACH ADD: CPT | Performed by: PHYSICIAN ASSISTANT

## 2023-03-07 RX ORDER — KETOCONAZOLE 20 MG/ML
SHAMPOO TOPICAL
Qty: 100 ML | Refills: 1 | Status: SHIPPED | OUTPATIENT
Start: 2023-03-07

## 2023-03-07 ASSESSMENT — PAIN SCALES - GENERAL: PAINLEVEL: NO PAIN (0)

## 2023-03-07 NOTE — NURSING NOTE
Prior to immunization administration, verified patients identity using patient s name and date of birth. Please see Immunization Activity for additional information.     Screening Questionnaire for Adult Immunization    Are you sick today?   No   Do you have allergies to medications, food, a vaccine component or latex?   No   Have you ever had a serious reaction after receiving a vaccination?   No   Do you have a long-term health problem with heart, lung, kidney, or metabolic disease (e.g., diabetes), asthma, a blood disorder, no spleen, complement component deficiency, a cochlear implant, or a spinal fluid leak?  Are you on long-term aspirin therapy?   Yes   Do you have cancer, leukemia, HIV/AIDS, or any other immune system problem?   No   Do you have a parent, brother, or sister with an immune system problem?   Yes   In the past 3 months, have you taken medications that affect  your immune system, such as prednisone, other steroids, or anticancer drugs; drugs for the treatment of rheumatoid arthritis, Crohn s disease, or psoriasis; or have you had radiation treatments?   No   Have you had a seizure, or a brain or other nervous system problem?   No   During the past year, have you received a transfusion of blood or blood    products, or been given immune (gamma) globulin or antiviral drug?   No   For women: Are you pregnant or is there a chance you could become       pregnant during the next month?   No   Have you received any vaccinations in the past 4 weeks?   No     Immunization questionnaire was positive for at least one answer.  Notified Provider.        Per orders of Alec Cardoso PA-C, injection of Tdap and PCV20 given by Mary Ramos MA. Patient instructed to remain in clinic for 15 minutes afterwards, and to report any adverse reaction to me immediately.       Screening performed by Mary Ramos MA on 3/7/2023 at 1:48 PM.

## 2023-03-07 NOTE — Clinical Note
Please abstract the following data from this visit with this patient into the appropriate field in Epic:Pap  Tests that can be patient reported without a hard copy:  pap smear done by this group [facility] on this date: 6/18/20

## 2023-03-07 NOTE — PROGRESS NOTES
SUBJECTIVE:   CC: Mo is an 47 year old who presents for preventive health visit.     Here today for a annual physical.  Has a number of years since her last.  Lives in [place].   with 3 boys.  1 is a first year at [employer].  Other 2 in high school.  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    History of a bicuspid aortic valve, nonrheumatic aortic valve stenosis, and a ascending aortic dilation.  Follows with cardiology.  Last appointment in 2021.  Seeing them upcoming in March.  History of whitecoat hypertension.  Elevated readings at today's visit.  Prior to arrival blood pressure at home 124/84.    Upcoming appointment with her OB/GYN. OGI.  Due for Pap.  Mammogram scheduled for tomorrow.    Exercises with walking her dogs for an hour and a half daily in addition to using the elliptical machine.  Asymptomatic with this.  Diet well-balanced.    Longstanding history of acid reflux.  Feels this in her upper throat as well as her epigastric region.  Made worse by red sauce/spicy foods.  Continues to worsen.  Uses Tums as needed.  Mother had a history of gastric bypass due to acid reflux.  Has never tried omeprazole or Pepcid.  He is due for colonoscopy, wonders if we could do a upper endoscopy for further evaluation.  Brother history of celiac disease.      Patient has been advised of split billing requirements and indicates understanding: Yes  History of Present Illness       Reason for visit:  General wellness check upShe consumes 0 sweetened beverage(s) daily.She exercises with enough effort to increase her heart rate 60 or more minutes per day.  She exercises with enough effort to increase her heart rate 6 days per week.   She is taking medications regularly.      Today's PHQ-2 Score:   PHQ-2 ( 1999 Pfizer) 3/7/2023   Q1: Little interest or pleasure in doing things 0   Q2: Feeling down, depressed or hopeless 0   PHQ-2 Score 0   Q1: Little interest or pleasure in doing things -   Q2:  Feeling down, depressed or hopeless -   PHQ-2 Score -       Have you ever done Advance Care Planning? (For example, a Health Directive, POLST, or a discussion with a medical provider or your loved ones about your wishes): No, advance care planning information given to patient to review.  Patient declined advance care planning discussion at this time.    Social History     Tobacco Use     Smoking status: Never     Smokeless tobacco: Never   Substance Use Topics     Alcohol use: Yes     Comment: occasional         Reviewed orders with patient.  Reviewed health maintenance and updated orders accordingly - Yes  Lab work is in process  Labs reviewed in EPIC  BP Readings from Last 3 Encounters:   03/07/23 124/84   04/06/21 (!) 150/97   11/21/17 140/84    Wt Readings from Last 3 Encounters:   03/07/23 78.2 kg (172 lb 4.8 oz)   04/06/21 76.7 kg (169 lb)   11/21/17 86.2 kg (190 lb)           Patient Active Problem List   Diagnosis     Aortic valve disorder     Nonrheumatic aortic valve stenosis     Benign essential hypertension     Ascending aorta dilatation (H)     Bicuspid aortic valve     Past Surgical History:   Procedure Laterality Date     C/SECTION, CLASSICAL         Social History     Tobacco Use     Smoking status: Never     Smokeless tobacco: Never   Substance Use Topics     Alcohol use: Yes     Comment: occasional     Family History   Problem Relation Age of Onset     Lupus Father      Other - See Comments Father 63        gallbladder issues     Hypertension Father      Celiac Disease Brother      Other - See Comments Paternal Grandmother         MG     Thyroid Disease Paternal Grandmother      Heart Disease Paternal Grandfather         valve     Other - See Comments Paternal Grandfather         menieres     Diverticulitis Paternal Grandfather      Other - See Comments Paternal Aunt         RA     Rheumatoid Arthritis Paternal Aunt          Current Outpatient Medications   Medication Sig Dispense Refill      Cholecalciferol (VITAMIN D3 PO) Take 1,000 Units by mouth daily.       ketoconazole (NIZORAL) 2 % external shampoo Apply 5 to 10 mL to wet scalp, lather, leave on 3 to 5 minutes, and rinse; apply twice weekly for 2 to 4 weeks. 100 mL 1     Multiple Vitamin (MULTIVITAMINS PO) Take 1 tablet by mouth daily.       Allergies   Allergen Reactions     No Known Drug Allergy      No lab results found.     Breast Cancer Screening: Upcoming mammo.     Pertinent mammograms are reviewed under the imaging tab.    History of abnormal Pap smear: Upcoming Pap     Reviewed and updated as needed this visit by clinical staff   Tobacco  Allergies  Meds    Surg Hx  Fam Hx          Reviewed and updated as needed this visit by Provider   Tobacco  Allergies  Meds    Surg Hx  Fam Hx         Past Medical History:   Diagnosis Date     Aortic stenosis 2015     Aortic valve disorders     Bicuspid aortic valve     Ascending aorta dilatation (H)      Congenital insufficiency of aortic valve-BICUSPID 2015      (normal spontaneous vaginal delivery)     x1     PIH (pregnancy induced hypertension)     delivered twins at 37 weeks      Past Surgical History:   Procedure Laterality Date     C/SECTION, CLASSICAL       OB History   No obstetric history on file.       Review of Systems  CONSTITUTIONAL: NEGATIVE for fever, chills, change in weight  INTEGUMENTARU/SKIN: NEGATIVE for worrisome rashes, moles or lesions  EYES: NEGATIVE for vision changes or irritation  ENT: NEGATIVE for ear, mouth and throat problems  RESP: NEGATIVE for significant cough or SOB  BREAST: NEGATIVE for masses, tenderness or discharge  CV: NEGATIVE for chest pain, palpitations or peripheral edema  GI: NEGATIVE for nausea, abdominal pain, heartburn, or change in bowel habits  : NEGATIVE for unusual urinary or vaginal symptoms. Periods are regular.  MUSCULOSKELETAL: NEGATIVE for significant arthralgias or myalgia  NEURO: NEGATIVE for weakness, dizziness or  "paresthesias  PSYCHIATRIC: NEGATIVE for changes in mood or affect     OBJECTIVE:   /84   Pulse 83   Temp 97.6  F (36.4  C) (Oral)   Resp 16   Ht 1.651 m (5' 5\")   Wt 78.2 kg (172 lb 4.8 oz)   LMP 02/26/2023 (Approximate)   SpO2 100%   Breastfeeding No   BMI 28.67 kg/m    Physical Exam  GENERAL: healthy, alert and no distress  EYES: Eyes grossly normal to inspection, PERRL and conjunctivae and sclerae normal  HENT: ear canals and TM's normal, nose and mouth without ulcers or lesions  NECK: no adenopathy, no asymmetry, masses, or scars and thyroid normal to palpation  RESP: lungs clear to auscultation - no rales, rhonchi or wheezes  CV: regular rate and rhythm, systolic murmur grade 2, no peripheral edema and peripheral pulses strong  ABDOMEN: soft, nontender, no hepatosplenomegaly, no masses and bowel sounds normal  MS: no gross musculoskeletal defects noted, no edema  SKIN: Erythematous patch posterior scalp.  Dry/scaling.  No additional suspicious lesions or rashes.   NEURO: Normal strength and tone, mentation intact and speech normal  PSYCH: mentation appears normal, affect normal/bright    ASSESSMENT/PLAN:   Mo was seen today for gastrointestinal problem, physical and gastrophageal reflux.    Diagnoses and all orders for this visit:    Annual physical exam    Annual physical blood work today.  CBC, CMP, TSH, lipid panel.  history of vitamin D deficiency, will add this on.  Referral for colonoscopy/upper endoscopy.  Upcoming cardiology follow-up.  Upcoming OB/GYN.  Prevnar 20 + Tdap today.    -     CBC with platelets and differential; Future  -     Comprehensive metabolic panel (BMP + Alb, Alk Phos, ALT, AST, Total. Bili, TP); Future  -     TSH with free T4 reflex; Future  -     Lipid panel reflex to direct LDL Fasting; Future  -     Colonoscopy Screening  Referral; Future  -     CBC with platelets and differential  -     Comprehensive metabolic panel (BMP + Alb, Alk Phos, ALT, AST, " Total. Bili, TP)  -     TSH with free T4 reflex  -     Lipid panel reflex to direct LDL Fasting    Gastroesophageal reflux disease, unspecified whether esophagitis present  Screen for colon cancer    Referral placed for screening colonoscopy.  Additionally, referral placed for a upper endoscopy.  Did recommend initiating either Pepcid or omeprazole.  We will plan to obtain upper endoscopy first for further evaluation.  Discussed diet modifications.    -     Adult GI  Referral - Procedure Only; Future  -     Colonoscopy Screening  Referral; Future    Screening for HIV (human immunodeficiency virus)  -     HIV Antigen Antibody Combo; Future  -     HIV Antigen Antibody Combo    Need for hepatitis C screening test  -     Hepatitis C Screen Reflex to HCV RNA Quant and Genotype; Future  -     Hepatitis C Screen Reflex to HCV RNA Quant and Genotype    Cervical cancer screening  Upcoming appointment with OB/GYN.    Screening for hyperlipidemia  -     Lipid panel reflex to direct LDL Fasting    Need for vaccination  -     TDAP VACCINE (Adacel, Boostrix)  -     Pneumococcal 20 Valent Conjugate (Prevnar 20)    Vitamin D deficiency  -     Vitamin D Deficiency; Future  -     Vitamin D Deficiency    Rash and nonspecific skin eruption    Suspect seborrheic dermatitis.  Ketoconazole shampoo.  Encourage follow-up with dermatologist for full body skin check.    -     ketoconazole (NIZORAL) 2 % external shampoo; Apply 5 to 10 mL to wet scalp, lather, leave on 3 to 5 minutes, and rinse; apply twice weekly for 2 to 4 weeks.    Other orders  -     REVIEW OF HEALTH MAINTENANCE PROTOCOL ORDERS    Patient has been advised of split billing requirements and indicates understanding: Yes      COUNSELING:  Reviewed preventive health counseling, as reflected in patient instructions       Regular exercise       Healthy diet/nutrition       Vision screening       Colorectal Cancer Screening       Consider Hep C screening for all  patients one time for ages 18-79 years       HIV screeninx in teen years, 1x in adult years, and at intervals if high risk        She reports that she has never smoked. She has never used smokeless tobacco.             The likelihood of other entities in the differential is insufficient to justify any further testing for them at this time. This was explained to the patient. The patient was advised that persistent or worsening symptoms would require further evaluation. Patient advised to call the office and if unable to reach to go to the emergency room if they develop any new or worsening symptoms. Expressed understanding and agreement with above stated plan.     VALDEMAR Olivo [facility]

## 2023-03-07 NOTE — PROGRESS NOTES
{PROVIDER CHARTING PREFERENCE:299286}    Haroldo Hassan is a 47 year old{ACCOMPANIED BY STATEMENT (Optional):138953}, presenting for the following health issues:  Gastrointestinal Problem      History of Present Illness       Reason for visit:  General wellness check upShe consumes 0 sweetened beverage(s) daily.She exercises with enough effort to increase her heart rate 60 or more minutes per day.  She exercises with enough effort to increase her heart rate 6 days per week.   She is taking medications regularly.       {SUPERLIST (Optional):776511}  {additonal problems for provider to add (Optional):720491}    Review of Systems   {ROS COMP (Optional):685905}      Objective    There were no vitals taken for this visit.  There is no height or weight on file to calculate BMI.  Physical Exam   {Exam List (Optional):952889}    {Diagnostic Test Results (Optional):749634}    {AMBULATORY ATTESTATION (Optional):481562}

## 2023-03-08 ENCOUNTER — HOSPITAL ENCOUNTER (OUTPATIENT)
Dept: MAMMOGRAPHY | Facility: CLINIC | Age: 48
Discharge: HOME | End: 2023-03-08
Attending: OBSTETRICS & GYNECOLOGY | Admitting: OBSTETRICS & GYNECOLOGY
Payer: COMMERCIAL

## 2023-03-08 DIAGNOSIS — Z12.31 VISIT FOR SCREENING MAMMOGRAM: ICD-10-CM

## 2023-03-08 LAB
DEPRECATED CALCIDIOL+CALCIFEROL SERPL-MC: 43 UG/L (ref 20–75)
HCV AB SERPL QL IA: NONREACTIVE
HIV 1+2 AB+HIV1 P24 AG SERPL QL IA: NONREACTIVE

## 2023-03-08 PROCEDURE — 77067 SCR MAMMO BI INCL CAD: CPT

## 2023-03-08 NOTE — RESULT ENCOUNTER NOTE
"Lyndsay Hassan,     It was very nice meeting you yesterday for your annual physical.     - Normal CBC (white blood cells, hemoglobin and platelets)    - Normal electrolytes, kidney function and liver enzymes     - Slightly elevated thyroid stimulating hormone, normal t4. We will monitor this.     - Normal HDL (\"good cholesterol\")    - Elevated LDL (\"bad cholesterol\") and total cholesterol. Not at the point where you need medication. Focus on diet and exercise! I'll attach a diet below.     - Negative one-time screening Hepatitis C and HIV    Cut back on the amount of fat and cholesterol in your meals  Eat more fresh vegetables and fruits  Eat lean proteins such as fish, poultry, beans, and peas  Eat less red meat and processed meats  Use low-fat dairy products  Use vegetable and nut oils in limited amounts  Limit sweets and processed foods like chips, cookies, and baked goods  Limit sugar-sweetened beverages you drink  Limit how often you eat out  Limit alcohol    The 10-year ASCVD risk score (Stephen TORREZ, et al., 2019) is: 1%    Values used to calculate the score:      Age: 47 years      Sex: Female      Is Non- : No      Diabetic: No      Tobacco smoker: No      Systolic Blood Pressure: 124 mmHg      Is BP treated: No      HDL Cholesterol: 66 mg/dL      Total Cholesterol: 221 mg/dL    Please let me know if you have any concerns.    Alec Veloz PA-C [facility]  "

## 2023-03-15 ENCOUNTER — TELEPHONE (OUTPATIENT)
Dept: GASTROENTEROLOGY | Facility: CLINIC | Age: 48
End: 2023-03-15
Payer: COMMERCIAL

## 2023-03-15 NOTE — TELEPHONE ENCOUNTER
Screening Questions  BLUE  KIND OF PREP RED  LOCATION [review exclusion criteria] GREEN  SEDATION TYPE        Y Are you active on mychart?       NUNO QUILES Ordering/Referring Provider?         What type of coverage do you have?      N Have you had a positive covid test in the last 14 days?     28.6 1. BMI  [BMI 40+ - review exclusion criteria]    Y  2. Are you able to give consent for your medical care? [IF NO,RN REVIEW]          N  3. Are you taking any prescription pain medications on a routine schedule   (ex narcotics: oxycodone, roxicodone, oxycontin,  and percocet)? [RN Review]          3a. EXTENDED PREP What kind of prescription?     N 4. Do you have any chemical dependencies such as alcohol, street drugs, or methadone?        **If yes 3- 5 , please schedule with MAC sedation.**          IF YES TO ANY 6 - 10 - HOSPITAL SETTING ONLY.     N 6.   Do you need assistance transferring?     N 7.   Have you had a heart or lung transplant?    N 8.   Are you currently on dialysis?   N 9.   Do you use daily home oxygen?   N 10. Do you take nitroglycerin?   10a.  If yes, how often?     11. [FEMALES]  N Are you currently pregnant?    11a.  If yes, how many weeks? [ Greater than 12 weeks, OR NEEDED]    N 12. Do you have Pulmonary Hypertension? *NEED PAC APPT AT [facility] w/ [facility]*     N 13. [review exclusion criteria]  Do you have any implantable devices in your body (pacemaker, defib, LVAD)?    N 14. In the past 6 months, have you had any heart related issues including cardiomyopathy or heart attack?     14a.  If yes, did it require cardiac stenting if so when?     N 15. Have you had a stroke or Transient ischemic attack (TIA - aka  mini stroke ) within 6 months?      N 16. Do you have mod to severe Obstructive Sleep Apnea?  [Hospital only]    N 17. Do you have SEVERE AND UNCONTROLLED asthma? *NEED PAC APPT AT [facility] w/MAC*     18. Are you currently taking any blood thinners?     18a. No. Continue to 19.   18b.    N  "19. Do you take the medication Phentermine?    19a. If yes, \"Hold for 7 days before procedure.  Please consult your prescribing provider if you have questions about holding this medication.\"     N  20. Do you have chronic kidney disease?      N  21. Do you have a diagnosis of diabetes?     N  22. On a regular basis do you go 3-5 days between bowel movements?      23. Preferred LOCAL Pharmacy for Pre Prescription    [ LIST ONLY ONE PHARMACY]     [facility] [id number] [place] [facility] - [address]        - CLOSING REMINDERS -    Informed patient they will need an adult    Cannot take any type of public or medical transportation alone    Conscious Sedation- Needs  for 6 hours after the procedure       MAC/General-Needs  for 24 hours after procedure    Pre-Procedure Covid test to be completed [[facility] PCR Testing Required]    Confirmed Nurse will call to complete assessment       - SCHEDULING DETAILS -  N Hospital Setting Required? If yes, what is the exclusion?:    ADAMA  Surgeon    5/1  Date of Procedure  Upper and Lower Endoscopy [EGD and Colonoscopy]  Type of Procedure Scheduled  [facility]   [facility]If you answer yes to questions #8, #20, #21Which Colonoscopy Prep was Sent?     CS Sedation Type     N PAC / Pre-op Required                 "

## 2023-03-16 ENCOUNTER — HOSPITAL ENCOUNTER (OUTPATIENT)
Dept: CARDIOLOGY | Facility: CLINIC | Age: 48
Discharge: HOME | End: 2023-03-16
Attending: INTERNAL MEDICINE | Admitting: INTERNAL MEDICINE
Payer: COMMERCIAL

## 2023-03-16 DIAGNOSIS — I35.0 NONRHEUMATIC AORTIC VALVE STENOSIS: ICD-10-CM

## 2023-03-16 DIAGNOSIS — I77.810 ASCENDING AORTA DILATATION (H): ICD-10-CM

## 2023-03-16 DIAGNOSIS — Q23.81 BICUSPID AORTIC VALVE: ICD-10-CM

## 2023-03-16 LAB — LVEF ECHO: NORMAL

## 2023-03-16 PROCEDURE — 93306 TTE W/DOPPLER COMPLETE: CPT | Mod: 26 | Performed by: INTERNAL MEDICINE

## 2023-03-16 PROCEDURE — 93306 TTE W/DOPPLER COMPLETE: CPT

## 2023-03-21 ENCOUNTER — OFFICE VISIT (OUTPATIENT)
Dept: CARDIOLOGY | Facility: CLINIC | Age: 48
End: 2023-03-21
Attending: INTERNAL MEDICINE
Payer: COMMERCIAL

## 2023-03-21 VITALS
HEIGHT: 65 IN | SYSTOLIC BLOOD PRESSURE: 149 MMHG | WEIGHT: 167.9 LBS | DIASTOLIC BLOOD PRESSURE: 95 MMHG | BODY MASS INDEX: 27.97 KG/M2 | HEART RATE: 89 BPM

## 2023-03-21 DIAGNOSIS — I77.810 ASCENDING AORTA DILATATION (H): ICD-10-CM

## 2023-03-21 DIAGNOSIS — I35.0 NONRHEUMATIC AORTIC VALVE STENOSIS: ICD-10-CM

## 2023-03-21 DIAGNOSIS — Q23.81 BICUSPID AORTIC VALVE: Primary | ICD-10-CM

## 2023-03-21 DIAGNOSIS — I10 BENIGN ESSENTIAL HYPERTENSION: ICD-10-CM

## 2023-03-21 PROCEDURE — 99212 OFFICE O/P EST SF 10 MIN: CPT | Performed by: INTERNAL MEDICINE

## 2023-03-21 NOTE — PROGRESS NOTES
HPI and Plan:    Mo is a very nice 47-year-old woman with past medical history significant for a bicuspid aortic valve with aortic stenosis.  She has a son who, in June 2020, underwent surgery for his subvalvular membrane.  Mo had a TALHA in 2013 confirming her bicuspid aortic valve and again documenting that she has no subvalvular membrane.  She also had an MRI at that time, also demonstrating no evidence of subaortic membrane and her ascending aorta was 3.6 x 3.5.       Mo returns to clinic after a 2-year hiatus stating she is doing great.  She states she probably exercises about 2-1/2 hours a day.  She walks her dogs for an hour and a half, then oftentimes will do her own workout stairstepper for at least a half hour to an hour and she has no symptoms at all.  She states she never has any chest discomfort or limitations with her activities.     Assessment and plan.  Sadie has no symptoms at this time to suggest ischemia, heart failure or significant arrhythmia.  Clinically her aortic valve appears to be benign both by physical exam and clinical story.    Her echocardiogram still measures a mean gradient of 30 with a valve area estimated to be 0.93.  She has normal left ventricular wall thickness and function.  Grade 1 diastolic dysfunction.  Pulmonary pressures estimated be 23 mmHg plus the radial pressure.  Dimensionless index of 36.  All indicating moderate aortic stenosis.    We talked about the importance of watching for chest discomfort, shortness of breath or exercise intolerance and or lightheadedness dizziness syncope or near syncope.    We will plan on visiting again next year with repeat echo.  If she should develop any symptoms have asked her to call me sooner.    Today's clinic visit entailed:  Review of the result(s) of each unique test - Echocardiogram, lab work  Ordering of each unique test  Prescription drug management  15 minutes spent on the date of the encounter doing chart review,  history and exam, documentation and further activities per the note  Provider  Link to MDM Help Grid     The level of medical decision making during this visit was of moderate complexity.      No orders of the defined types were placed in this encounter.      No orders of the defined types were placed in this encounter.      There are no discontinued medications.      Encounter Diagnoses   Name Primary?     Bicuspid aortic valve      Nonrheumatic aortic valve stenosis      Ascending aorta dilatation (H)      Benign essential hypertension Yes       CURRENT MEDICATIONS:  Current Outpatient Medications   Medication Sig Dispense Refill     Cholecalciferol (VITAMIN D3 PO) Take 1,000 Units by mouth daily.       ketoconazole (NIZORAL) 2 % external shampoo Apply 5 to 10 mL to wet scalp, lather, leave on 3 to 5 minutes, and rinse; apply twice weekly for 2 to 4 weeks. 100 mL 1     Multiple Vitamin (MULTIVITAMINS PO) Take 1 tablet by mouth daily.         ALLERGIES     Allergies   Allergen Reactions     No Known Drug Allergy        PAST MEDICAL HISTORY:  Past Medical History:   Diagnosis Date     Aortic stenosis 2015     Aortic valve disorders     Bicuspid aortic valve     Ascending aorta dilatation (H)      Congenital insufficiency of aortic valve-BICUSPID 2015      (normal spontaneous vaginal delivery)     x1     PIH (pregnancy induced hypertension)     delivered twins at 37 weeks       PAST SURGICAL HISTORY:  Past Surgical History:   Procedure Laterality Date     C/SECTION, CLASSICAL         FAMILY HISTORY:  Family History   Problem Relation Age of Onset     Lupus Father      Other - See Comments Father 63        gallbladder issues     Hypertension Father      Celiac Disease Brother      Other - See Comments Paternal Grandmother         MG     Thyroid Disease Paternal Grandmother      Heart Disease Paternal Grandfather         valve     Other - See Comments Paternal Grandfather         cr      "Diverticulitis Paternal Grandfather      Other - See Comments Paternal Aunt         RA     Rheumatoid Arthritis Paternal Aunt        SOCIAL HISTORY:  Social History     Socioeconomic History     Marital status:      Spouse name: None     Number of children: None     Years of education: None     Highest education level: None   Tobacco Use     Smoking status: Never     Smokeless tobacco: Never   Substance and Sexual Activity     Alcohol use: Yes     Comment: occasional     Drug use: Never   Other Topics Concern     Parent/sibling w/ CABG, MI or angioplasty before 65F 55M? No     Caffeine Concern No     Sleep Concern No     Special Diet No     Exercise Yes     Comment: treadmill daily, biking     Bike Helmet Yes     Seat Belt Yes   Social History Narrative    , has 11yo son and 8.6 yo twins     Homemaker.       Review of Systems:  Skin:  Positive for itching back of head   Eyes:  Negative      ENT:  Negative      Respiratory:  Negative       Cardiovascular:  Negative      Gastroenterology: Positive for   Food sensetivity  Genitourinary:  Negative      Musculoskeletal:  Negative      Neurologic:  Negative      Psychiatric:  Negative      Heme/Lymph/Imm:  Negative      Endocrine:  Negative        Physical Exam:  Vitals: BP (!) 149/95   Pulse 89   Ht 1.651 m (5' 5\")   Wt 76.2 kg (167 lb 14.4 oz)   LMP 02/26/2023 (Approximate)   BMI 27.94 kg/m      Constitutional:  cooperative, alert and oriented, well developed, well nourished, in no acute distress overweight      Skin:  warm and dry to the touch, no apparent skin lesions or masses noted          Head:  normocephalic, no masses or lesions        Eyes:  pupils equal and round, conjunctivae and lids unremarkable, sclera white, no xanthalasma, EOMS intact, no nystagmus        Lymph:      ENT:  no pallor or cyanosis, dentition good        Neck:  carotid pulses are full and equal bilaterally        Respiratory:  normal breath sounds, clear to auscultation, " normal A-P diameter, normal symmetry, normal respiratory excursion, no use of accessory muscles         Cardiac: regular rhythm       grade 2;RUSB;radiation to the carotid;systolic murmur     mid peaking  pulses full and equal                                        GI:           Extremities and Muscular Skeletal:  no edema;no spinal abnormalities noted;normal muscle strength and tone              Neurological:  no gross motor deficits        Psych:  affect appropriate, oriented to time, person and place        CC  Jb Ortiz MD  [id number]  [place] [id number]

## 2023-03-21 NOTE — LETTER
3/21/2023    Alec Cardoso PA-C  [address]  [facility] [address]    RE: Mo Castaneda       Dear Colleague,     I had the pleasure of seeing Mo Castaneda in the [facility].  HPI and Plan:    Mo is a very nice 47-year-old woman with past medical history significant for a bicuspid aortic valve with aortic stenosis.  She has a son who, in June 2020, underwent surgery for his subvalvular membrane.  Mo had a TALHA in 2013 confirming her bicuspid aortic valve and again documenting that she has no subvalvular membrane.  She also had an MRI at that time, also demonstrating no evidence of subaortic membrane and her ascending aorta was 3.6 x 3.5.       Mo returns to clinic after a 2-year hiatus stating she is doing great.  She states she probably exercises about 2-1/2 hours a day.  She walks her dogs for an hour and a half, then oftentimes will do her own workout stairstepper for at least a half hour to an hour and she has no symptoms at all.  She states she never has any chest discomfort or limitations with her activities.     Assessment and plan.  Sadie has no symptoms at this time to suggest ischemia, heart failure or significant arrhythmia.  Clinically her aortic valve appears to be benign both by physical exam and clinical story.    Her echocardiogram still measures a mean gradient of 30 with a valve area estimated to be 0.93.  She has normal left ventricular wall thickness and function.  Grade 1 diastolic dysfunction.  Pulmonary pressures estimated be 23 mmHg plus the radial pressure.  Dimensionless index of 36.  All indicating moderate aortic stenosis.    We talked about the importance of watching for chest discomfort, shortness of breath or exercise intolerance and or lightheadedness dizziness syncope or near syncope.    We will plan on visiting again next year with repeat echo.  If she should develop any symptoms have asked her to call me sooner.    Today's clinic  visit entailed:  Review of the result(s) of each unique test - Echocardiogram, lab work  Ordering of each unique test  Prescription drug management  15 minutes spent on the date of the encounter doing chart review, history and exam, documentation and further activities per the note  Provider  Link to [facility] Help Grid     The level of medical decision making during this visit was of moderate complexity.      No orders of the defined types were placed in this encounter.      No orders of the defined types were placed in this encounter.      There are no discontinued medications.      Encounter Diagnoses   Name Primary?     Bicuspid aortic valve      Nonrheumatic aortic valve stenosis      Ascending aorta dilatation (H)      Benign essential hypertension Yes       CURRENT MEDICATIONS:  Current Outpatient Medications   Medication Sig Dispense Refill     Cholecalciferol (VITAMIN D3 PO) Take 1,000 Units by mouth daily.       ketoconazole (NIZORAL) 2 % external shampoo Apply 5 to 10 mL to wet scalp, lather, leave on 3 to 5 minutes, and rinse; apply twice weekly for 2 to 4 weeks. 100 mL 1     Multiple Vitamin (MULTIVITAMINS PO) Take 1 tablet by mouth daily.         ALLERGIES     Allergies   Allergen Reactions     No Known Drug Allergy        PAST MEDICAL HISTORY:  Past Medical History:   Diagnosis Date     Aortic stenosis 2015     Aortic valve disorders     Bicuspid aortic valve     Ascending aorta dilatation (H)      Congenital insufficiency of aortic valve-BICUSPID 2015      (normal spontaneous vaginal delivery)     x1     PIH (pregnancy induced hypertension)     delivered twins at 37 weeks       PAST SURGICAL HISTORY:  Past Surgical History:   Procedure Laterality Date     C/SECTION, CLASSICAL         FAMILY HISTORY:  Family History   Problem Relation Age of Onset     Lupus Father      Other - See Comments Father 63        gallbladder issues     Hypertension Father      Celiac Disease Brother      Other - See  "Comments Paternal Grandmother         MG     Thyroid Disease Paternal Grandmother      Heart Disease Paternal Grandfather         valve     Other - See Comments Paternal Grandfather         menieres     Diverticulitis Paternal Grandfather      Other - See Comments Paternal Aunt         RA     Rheumatoid Arthritis Paternal Aunt        SOCIAL HISTORY:  Social History     Socioeconomic History     Marital status:      Spouse name: None     Number of children: None     Years of education: None     Highest education level: None   Tobacco Use     Smoking status: Never     Smokeless tobacco: Never   Substance and Sexual Activity     Alcohol use: Yes     Comment: occasional     Drug use: Never   Other Topics Concern     Parent/sibling w/ CABG, MI or angioplasty before 65F 55M? No     Caffeine Concern No     Sleep Concern No     Special Diet No     Exercise Yes     Comment: treadmill daily, biking     Bike Helmet Yes     Seat Belt Yes   Social History Narrative    , has 9yo son and 8.6 yo twins     Homemaker.       Review of Systems:  Skin:  Positive for itching back of head   Eyes:  Negative      ENT:  Negative      Respiratory:  Negative       Cardiovascular:  Negative      Gastroenterology: Positive for   Food sensetivity  Genitourinary:  Negative      Musculoskeletal:  Negative      Neurologic:  Negative      Psychiatric:  Negative      Heme/Lymph/Imm:  Negative      Endocrine:  Negative        Physical Exam:  Vitals: BP (!) 149/95   Pulse 89   Ht 1.651 m (5' 5\")   Wt 76.2 kg (167 lb 14.4 oz)   LMP 02/26/2023 (Approximate)   BMI 27.94 kg/m      Constitutional:  cooperative, alert and oriented, well developed, well nourished, in no acute distress overweight      Skin:  warm and dry to the touch, no apparent skin lesions or masses noted          Head:  normocephalic, no masses or lesions        Eyes:  pupils equal and round, conjunctivae and lids unremarkable, sclera white, no xanthalasma, EOMS intact, " no nystagmus        Lymph:      ENT:  no pallor or cyanosis, dentition good        Neck:  carotid pulses are full and equal bilaterally        Respiratory:  normal breath sounds, clear to auscultation, normal A-P diameter, normal symmetry, normal respiratory excursion, no use of accessory muscles         Cardiac: regular rhythm       grade 2;RUSB;radiation to the carotid;systolic murmur     mid peaking  pulses full and equal                                        GI:           Extremities and Muscular Skeletal:  no edema;no spinal abnormalities noted;normal muscle strength and tone              Neurological:  no gross motor deficits        Psych:  affect appropriate, oriented to time, person and place        CC  Jb Ortiz MD  [address]  [place] [address]    Thank you for allowing me to participate in the care of your patient.      Sincerely,     Jb Ortiz MD     [facility]

## 2023-04-17 ENCOUNTER — TELEPHONE (OUTPATIENT)
Dept: GASTROENTEROLOGY | Facility: CLINIC | Age: 48
End: 2023-04-17

## 2023-04-17 DIAGNOSIS — Z12.11 ENCOUNTER FOR SCREENING COLONOSCOPY: Primary | ICD-10-CM

## 2023-04-17 RX ORDER — BISACODYL 5 MG/1
TABLET, DELAYED RELEASE ORAL
Qty: 4 TABLET | Refills: 0 | Status: SHIPPED | OUTPATIENT
Start: 2023-04-17

## 2023-04-17 NOTE — TELEPHONE ENCOUNTER
RN attempted to contact pt this afternoon for pre-assessment per patient request.  No answer.    Left message to return call [phone] #4    Ashanti Dubon RN  Endoscopy Procedure Pre Assessment RN

## 2023-04-17 NOTE — TELEPHONE ENCOUNTER
Attempted to contact patient regarding upcoming Colonoscopy/Upper endoscopy (EGD) procedure on 5.1.2023 for pre assessment questions.     Patient is unable to talk at this time.    Discuss Covid policy and designated  policy.    Pre op exam? N/A    Arrival time: 1000. Procedure time: 1045    Facility location: [facility]; [facility] [address] [place] [address]    Sedation type: Conscious sedation     Anticoagulants: No    Electronic implanted devices? No    Diabetic? No    Indication for procedure: screening colonoscopy    Bowel prep recommendation: Standard Golytely  d/t mg citrate recall    Prep instructions sent via [employer]. Bowel prep script sent to [facility] [address] [place] [address]      Ashanti Dubon RN  Endoscopy Procedure Pre Assessment RN

## 2023-05-01 ENCOUNTER — HOSPITAL ENCOUNTER (OUTPATIENT)
Facility: CLINIC | Age: 48
Discharge: HOME | End: 2023-05-01
Attending: INTERNAL MEDICINE | Admitting: INTERNAL MEDICINE
Payer: COMMERCIAL

## 2023-05-01 VITALS
OXYGEN SATURATION: 100 % | BODY MASS INDEX: 27.16 KG/M2 | HEIGHT: 65 IN | RESPIRATION RATE: 14 BRPM | SYSTOLIC BLOOD PRESSURE: 162 MMHG | DIASTOLIC BLOOD PRESSURE: 96 MMHG | HEART RATE: 70 BPM | WEIGHT: 163 LBS

## 2023-05-01 LAB
COLONOSCOPY: NORMAL
UPPER GI ENDOSCOPY: NORMAL

## 2023-05-01 PROCEDURE — 45380 COLONOSCOPY AND BIOPSY: CPT | Performed by: INTERNAL MEDICINE

## 2023-05-01 PROCEDURE — G0500 MOD SEDAT ENDO SERVICE >5YRS: HCPCS | Performed by: INTERNAL MEDICINE

## 2023-05-01 PROCEDURE — 999N000099 HC STATISTIC MODERATE SEDATION < 10 MIN: Performed by: INTERNAL MEDICINE

## 2023-05-01 PROCEDURE — 250N000009 HC RX 250: Performed by: INTERNAL MEDICINE

## 2023-05-01 PROCEDURE — 250N000011 HC RX IP 250 OP 636: Performed by: INTERNAL MEDICINE

## 2023-05-01 PROCEDURE — 99153 MOD SED SAME PHYS/QHP EA: CPT | Performed by: INTERNAL MEDICINE

## 2023-05-01 PROCEDURE — 88305 TISSUE EXAM BY PATHOLOGIST: CPT | Mod: TC | Performed by: INTERNAL MEDICINE

## 2023-05-01 PROCEDURE — 43235 EGD DIAGNOSTIC BRUSH WASH: CPT | Performed by: INTERNAL MEDICINE

## 2023-05-01 RX ORDER — LIDOCAINE 40 MG/G
CREAM TOPICAL
Status: DISCONTINUED | OUTPATIENT
Start: 2023-05-01 | End: 2023-05-03 | Stop reason: HOSPADM

## 2023-05-01 RX ORDER — ONDANSETRON 4 MG/1
4 TABLET, ORALLY DISINTEGRATING ORAL EVERY 6 HOURS PRN
Status: DISCONTINUED | OUTPATIENT
Start: 2023-05-01 | End: 2023-05-03 | Stop reason: HOSPADM

## 2023-05-01 RX ORDER — ONDANSETRON 2 MG/ML
4 INJECTION INTRAMUSCULAR; INTRAVENOUS EVERY 6 HOURS PRN
Status: DISCONTINUED | OUTPATIENT
Start: 2023-05-01 | End: 2023-05-03 | Stop reason: HOSPADM

## 2023-05-01 RX ORDER — NALOXONE HYDROCHLORIDE 0.4 MG/ML
0.2 INJECTION, SOLUTION INTRAMUSCULAR; INTRAVENOUS; SUBCUTANEOUS
Status: DISCONTINUED | OUTPATIENT
Start: 2023-05-01 | End: 2023-05-03 | Stop reason: HOSPADM

## 2023-05-01 RX ORDER — PROCHLORPERAZINE MALEATE 10 MG
10 TABLET ORAL EVERY 6 HOURS PRN
Status: DISCONTINUED | OUTPATIENT
Start: 2023-05-01 | End: 2023-05-03 | Stop reason: HOSPADM

## 2023-05-01 RX ORDER — NALOXONE HYDROCHLORIDE 0.4 MG/ML
0.4 INJECTION, SOLUTION INTRAMUSCULAR; INTRAVENOUS; SUBCUTANEOUS
Status: DISCONTINUED | OUTPATIENT
Start: 2023-05-01 | End: 2023-05-03 | Stop reason: HOSPADM

## 2023-05-01 RX ORDER — ONDANSETRON 2 MG/ML
4 INJECTION INTRAMUSCULAR; INTRAVENOUS
Status: DISCONTINUED | OUTPATIENT
Start: 2023-05-01 | End: 2023-05-03 | Stop reason: HOSPADM

## 2023-05-01 RX ORDER — FLUMAZENIL 0.1 MG/ML
0.2 INJECTION, SOLUTION INTRAVENOUS
Status: DISCONTINUED | OUTPATIENT
Start: 2023-05-01 | End: 2023-05-02 | Stop reason: HOSPADM

## 2023-05-01 RX ORDER — FENTANYL CITRATE 50 UG/ML
INJECTION, SOLUTION INTRAMUSCULAR; INTRAVENOUS PRN
Status: DISCONTINUED | OUTPATIENT
Start: 2023-05-01 | End: 2023-05-03 | Stop reason: HOSPADM

## 2023-05-01 ASSESSMENT — ACTIVITIES OF DAILY LIVING (ADL)
ADLS_ACUITY_SCORE: 35

## 2023-05-01 NOTE — H&P
Mo Castaneda  [id number]  female  48 year old      Reason for procedure/surgery: heartburn, screening    Patient Active Problem List   Diagnosis     Aortic valve disorder     Nonrheumatic aortic valve stenosis     Benign essential hypertension     Ascending aorta dilatation (H)     Bicuspid aortic valve       Past Surgical History:    Past Surgical History:   Procedure Laterality Date     C/SECTION, CLASSICAL         Past Medical History:   Past Medical History:   Diagnosis Date     Aortic stenosis 2015     Aortic valve disorders     Bicuspid aortic valve     Ascending aorta dilatation (H)      Congenital insufficiency of aortic valve-BICUSPID 2015      (normal spontaneous vaginal delivery)     x1     PIH (pregnancy induced hypertension)     delivered twins at 37 weeks       Social History:   Social History     Tobacco Use     Smoking status: Never     Smokeless tobacco: Never   Vaping Use     Vaping status: Not on file   Substance Use Topics     Alcohol use: Yes     Comment: occasional       Family History:   Family History   Problem Relation Age of Onset     Lupus Father      Other - See Comments Father 63        gallbladder issues     Hypertension Father      Celiac Disease Brother      Other - See Comments Paternal Grandmother         MG     Thyroid Disease Paternal Grandmother      Heart Disease Paternal Grandfather         valve     Other - See Comments Paternal Grandfather         menieres     Diverticulitis Paternal Grandfather      Other - See Comments Paternal Aunt         RA     Rheumatoid Arthritis Paternal Aunt        Allergies:   Allergies   Allergen Reactions     No Known Drug Allergy        Active Medications:   No current outpatient medications on file.       Systemic Review:   CONSTITUTIONAL: NEGATIVE for fever, chills, change in weight  ENT/MOUTH: NEGATIVE for ear, mouth and throat problems  RESP: NEGATIVE for significant cough or SOB  CV: NEGATIVE for chest pain, palpitations or  "peripheral edema    Physical Examination:   Vital Signs: BP (!) 149/98   Ht 1.651 m (5' 5\")   Wt 73.9 kg (163 lb)   LMP 02/26/2023 (Approximate)   SpO2 100%   BMI 27.12 kg/m    GENERAL: healthy, alert and no distress  NECK: no adenopathy, no asymmetry, masses, or scars  RESP: lungs clear to auscultation - no rales, rhonchi or wheezes  CV: regular rate and rhythm, normal S1 S2, no S3 or S4, + systolic murmur, click or rub, no peripheral edema and peripheral pulses strong  ABDOMEN: soft, nontender, no hepatosplenomegaly, no masses and bowel sounds normal  MS: no gross musculoskeletal defects noted, no edema    ASA Classification: (II)  Mild systemic disease  Airway Exam: Mallampati Score: Class II (Complete visualization of uvula)    Plan: Appropriate to proceed as scheduled.      Paul Elliott MD  5/1/2023    PCP:  Alec Cardoso    "

## 2023-05-02 LAB
PATH REPORT.COMMENTS IMP SPEC: NORMAL
PATH REPORT.COMMENTS IMP SPEC: NORMAL
PATH REPORT.FINAL DX SPEC: NORMAL
PATH REPORT.GROSS SPEC: NORMAL
PATH REPORT.MICROSCOPIC SPEC OTHER STN: NORMAL
PATH REPORT.RELEVANT HX SPEC: NORMAL
PHOTO IMAGE: NORMAL

## 2023-05-02 PROCEDURE — 88305 TISSUE EXAM BY PATHOLOGIST: CPT | Mod: 26 | Performed by: PATHOLOGY

## 2023-05-02 ASSESSMENT — ACTIVITIES OF DAILY LIVING (ADL)
ADLS_ACUITY_SCORE: 35

## 2023-05-03 ASSESSMENT — ACTIVITIES OF DAILY LIVING (ADL)
ADLS_ACUITY_SCORE: 35

## 2023-05-04 ENCOUNTER — TRANSFERRED RECORDS (OUTPATIENT)
Dept: MULTI SPECIALTY CLINIC | Facility: CLINIC | Age: 48
End: 2023-05-04

## 2023-05-04 LAB — PAP SMEAR - HIM PATIENT REPORTED: NORMAL

## 2024-06-02 ENCOUNTER — HEALTH MAINTENANCE LETTER (OUTPATIENT)
Age: 49
End: 2024-06-02

## 2025-02-04 ENCOUNTER — TELEPHONE (OUTPATIENT)
Dept: CARDIOLOGY | Facility: CLINIC | Age: 50
End: 2025-02-04
Payer: COMMERCIAL

## 2025-02-04 DIAGNOSIS — I35.0 NONRHEUMATIC AORTIC VALVE STENOSIS: ICD-10-CM

## 2025-02-04 DIAGNOSIS — I77.810 ASCENDING AORTA DILATATION: Primary | ICD-10-CM

## 2025-02-04 DIAGNOSIS — I10 BENIGN ESSENTIAL HYPERTENSION: ICD-10-CM

## 2025-02-04 DIAGNOSIS — Q23.81 BICUSPID AORTIC VALVE: ICD-10-CM

## 2025-02-04 NOTE — TELEPHONE ENCOUNTER
Health Call Center    Phone Message    May a detailed message be left on voicemail: no     Reason for Call: Order(s): Other:   Reason for requested: echocardiogram/ follow up for bicuspid aortic valve  Date needed: whenever possible  Provider name: Angel    Please place echo order and call pt to schedule follow up care.  Pt doesn't know who she should be seeing now that Dr. Ortiz is leaving.        Action Taken: Other: cardio    Travel Screening: Not Applicable     Date of Service:

## 2025-02-05 NOTE — TELEPHONE ENCOUNTER
Jb Ortiz MD Hiljus, Audrey G RN10 hours ago (10:17 PM)     Yes.  She should have an echo.  And she can follow-up with whoever has availability.     Keke Aceves, Jb Anguiano MD; [facility] 217 hours ago (3:11 PM)     Ok to order echo? Pt is overdue for follow up, last seen 3/2023 w/ echo, hx bicuspid AV  Who should she see instead as your schedule is full?     =============================================    Orders for Echocardiogram and Cardiology follow up placed. Patient called to inform of above recommendations from Dr. Ortiz and instructed to call scheduling to make appointments. Number provided.

## 2025-03-19 ENCOUNTER — HOSPITAL ENCOUNTER (OUTPATIENT)
Dept: CARDIOLOGY | Facility: CLINIC | Age: 50
Discharge: HOME | End: 2025-03-19
Attending: INTERNAL MEDICINE
Payer: COMMERCIAL

## 2025-03-19 DIAGNOSIS — I35.0 NONRHEUMATIC AORTIC VALVE STENOSIS: ICD-10-CM

## 2025-03-19 DIAGNOSIS — Q23.81 BICUSPID AORTIC VALVE: ICD-10-CM

## 2025-03-19 DIAGNOSIS — I77.810 ASCENDING AORTA DILATATION: ICD-10-CM

## 2025-03-19 DIAGNOSIS — I10 BENIGN ESSENTIAL HYPERTENSION: ICD-10-CM

## 2025-03-19 LAB — LVEF ECHO: NORMAL

## 2025-03-19 PROCEDURE — 93306 TTE W/DOPPLER COMPLETE: CPT

## 2025-03-26 ENCOUNTER — HOSPITAL ENCOUNTER (OUTPATIENT)
Dept: MAMMOGRAPHY | Facility: CLINIC | Age: 50
Discharge: HOME | End: 2025-03-26
Attending: OBSTETRICS & GYNECOLOGY
Payer: COMMERCIAL

## 2025-03-26 DIAGNOSIS — Z12.31 VISIT FOR SCREENING MAMMOGRAM: ICD-10-CM

## 2025-03-26 PROCEDURE — 77063 BREAST TOMOSYNTHESIS BI: CPT

## 2025-03-26 PROCEDURE — 77067 SCR MAMMO BI INCL CAD: CPT

## 2025-03-27 ENCOUNTER — ANCILLARY ORDERS (OUTPATIENT)
Dept: MAMMOGRAPHY | Facility: CLINIC | Age: 50
End: 2025-03-27
Payer: COMMERCIAL

## 2025-03-27 DIAGNOSIS — R92.30 DENSE BREAST TISSUE: ICD-10-CM

## 2025-03-27 DIAGNOSIS — R92.8 ABNORMAL MAMMOGRAM: Primary | ICD-10-CM

## 2025-03-28 ENCOUNTER — HOSPITAL ENCOUNTER (OUTPATIENT)
Dept: MAMMOGRAPHY | Facility: CLINIC | Age: 50
Discharge: HOME | End: 2025-03-28
Attending: OBSTETRICS & GYNECOLOGY
Payer: COMMERCIAL

## 2025-03-28 DIAGNOSIS — R92.8 ABNORMAL MAMMOGRAM: ICD-10-CM

## 2025-03-28 DIAGNOSIS — R92.30 DENSE BREAST TISSUE: ICD-10-CM

## 2025-03-28 PROCEDURE — 77061 BREAST TOMOSYNTHESIS UNI: CPT | Mod: LT

## 2025-04-01 ENCOUNTER — OFFICE VISIT (OUTPATIENT)
Dept: CARDIOLOGY | Facility: CLINIC | Age: 50
End: 2025-04-01
Payer: COMMERCIAL

## 2025-04-01 ENCOUNTER — TELEPHONE (OUTPATIENT)
Dept: CARDIOLOGY | Facility: CLINIC | Age: 50
End: 2025-04-01

## 2025-04-01 VITALS
WEIGHT: 183 LBS | OXYGEN SATURATION: 99 % | HEART RATE: 85 BPM | SYSTOLIC BLOOD PRESSURE: 137 MMHG | DIASTOLIC BLOOD PRESSURE: 90 MMHG | BODY MASS INDEX: 30.49 KG/M2 | HEIGHT: 65 IN

## 2025-04-01 DIAGNOSIS — Q23.81 BICUSPID AORTIC VALVE: Primary | ICD-10-CM

## 2025-04-01 DIAGNOSIS — I77.810 ASCENDING AORTA DILATATION: ICD-10-CM

## 2025-04-01 DIAGNOSIS — I35.0 NONRHEUMATIC AORTIC VALVE STENOSIS: ICD-10-CM

## 2025-04-01 DIAGNOSIS — I10 BENIGN ESSENTIAL HYPERTENSION: ICD-10-CM

## 2025-04-01 PROCEDURE — 3075F SYST BP GE 130 - 139MM HG: CPT | Performed by: INTERNAL MEDICINE

## 2025-04-01 PROCEDURE — 99214 OFFICE O/P EST MOD 30 MIN: CPT | Performed by: INTERNAL MEDICINE

## 2025-04-01 PROCEDURE — 3080F DIAST BP >= 90 MM HG: CPT | Performed by: INTERNAL MEDICINE

## 2025-04-01 NOTE — TELEPHONE ENCOUNTER
[facility] Call Center    Phone Message    May a detailed message be left on voicemail: yes     Reason for Call: Other: Pt is going to [place] next  month and she wanted to make sure  she did not have any restriction and if so she wanted to make sure what they were.     Action Taken: Other: cardio    Travel Screening: Not Applicable     Date of Service:   Thank you!  [facility]

## 2025-04-01 NOTE — PROGRESS NOTES
HPI and Plan:   Mo is a very nice 49-year-old woman with past medical history significant for a bicuspid aortic valve with aortic stenosis.  She has a son who, in June 2020, underwent surgery for his subvalvular membrane.  Mo had a TALHA in 2013 confirming her bicuspid aortic valve and again documenting that she has no subvalvular membrane.  She also had an MRI at that time, also demonstrating no evidence of subaortic membrane and her ascending aorta was 3.6 x 3.5.       Mo returns to clinic after a 2-year hiatus stating she is doing great.  .  She states she probably exercises about 2-1/2 hours a day.  She walks her dogs for an hour and a half, then oftentimes will do her own workout stairstepper for at least a half hour to an hour and she has no symptoms at all.  She states she never has any chest discomfort, lightheadedness or limitations with her activities.      Assessment and plan.   Mo has no symptoms at this time to suggest ischemia, heart failure or significant arrhythmia.  Clinically her aortic valve appears to be benign both by physical exam and clinical story.    Echo however continues to progress.  Ejection fraction is 65%.  Gradient across the valve is now 42 giving a valve area 1.0 and a dimensionless index of 0.32.  She has an elevated stroke-volume index at 56.     We talked about the importance of watching for chest discomfort, shortness of breath or exercise intolerance and or lightheadedness dizziness syncope or near syncope.    She is considering breast reduction surgery and I think she is okay to proceed as long as she  remains asymptomatic.    She asks about hormone replacement therapy as she is approaching menopause.  We discussed the literature on hormone replacement therapy.  If she has no family history of breast cancer or hypercoagulable state and she is quite symptomatic as she goes through menopause it is not unreasonable to go on HRT for up to 8 years.     I will have her  establish with Dr. Barcenas March in 6 months given that she has a bicuspid aortic valve and a son who also has congenital heart disease who will probably be graduating to [employer] in the next couple years.  If she should develop any symptoms  I have asked her to call us sooner.    Thank you for allow me to participate in this patient's care.  Sincerely,                               Jb Ortiz MD [facility]      Today's clinic visit entailed:  Review of the result(s) of each unique test - echocardiogram  Ordering of each unique test  Prescription drug management  30 minutes spent by me on the date of the encounter doing chart review, history and exam, documentation and further activities per the note  Provider  Link to [facility] Help Grid     The level of medical decision making during this visit was of moderate complexity.      Orders Placed This Encounter   Procedures    Follow-Up with Cardiology    Echocardiogram Complete       No orders of the defined types were placed in this encounter.      Medications Discontinued During This Encounter   Medication Reason    polyethylene glycol (GOLYTELY) 236 g suspension     ketoconazole (NIZORAL) 2 % external shampoo     bisacodyl (DULCOLAX) 5 MG EC tablet          Encounter Diagnoses   Name Primary?    Ascending aorta dilatation     Benign essential hypertension     Nonrheumatic aortic valve stenosis     Bicuspid aortic valve Yes       CURRENT MEDICATIONS:  Current Outpatient Medications   Medication Sig Dispense Refill    Cholecalciferol (VITAMIN D3 PO) Take 1,000 Units by mouth daily.      Multiple Vitamin (MULTIVITAMINS PO) Take 1 tablet by mouth daily.         ALLERGIES     Allergies   Allergen Reactions    No Known Drug Allergy        PAST MEDICAL HISTORY:  Past Medical History:   Diagnosis Date    Aortic stenosis 2015    Aortic valve disorders     Bicuspid aortic valve    Ascending aorta dilatation     Congenital insufficiency of aortic valve-BICUSPID 2015      (normal spontaneous vaginal delivery)     x1    PIH (pregnancy induced hypertension)     delivered twins at 37 weeks       PAST SURGICAL HISTORY:  Past Surgical History:   Procedure Laterality Date    C/SECTION, CLASSICAL      COLONOSCOPY N/A 5/1/2023    Procedure: Colonoscopy;  Surgeon: Paul Elliott MD;  Location:  GI    ESOPHAGOSCOPY, GASTROSCOPY, DUODENOSCOPY (EGD), COMBINED N/A 5/1/2023    Procedure: Esophagoscopy, gastroscopy, duodenoscopy (EGD), combined;  Surgeon: Paul Elliott MD;  Location:  GI       FAMILY HISTORY:  Family History   Problem Relation Age of Onset    Lupus Father     Other - See Comments Father 63        gallbladder issues    Hypertension Father     Celiac Disease Brother     Other - See Comments Paternal Grandmother         MG    Thyroid Disease Paternal Grandmother     Heart Disease Paternal Grandfather         valve    Other - See Comments Paternal Grandfather         menieres    Diverticulitis Paternal Grandfather     Other - See Comments Paternal Aunt         RA    Rheumatoid Arthritis Paternal Aunt        SOCIAL HISTORY:  Social History     Socioeconomic History    Marital status:      Spouse name: None    Number of children: None    Years of education: None    Highest education level: None   Tobacco Use    Smoking status: Never    Smokeless tobacco: Never   Substance and Sexual Activity    Alcohol use: Yes     Comment: occasional    Drug use: Never   Other Topics Concern    Parent/sibling w/ CABG, MI or angioplasty before 65F 55M? No    Caffeine Concern No    Sleep Concern No    Special Diet No    Exercise Yes     Comment: treadmill daily, biking    Bike Helmet Yes    Seat Belt Yes   Social History Narrative    , has 9yo son and 8.4 yo twins     Homemaker.       Review of Systems:  Skin:  Negative       Eyes:  Negative      ENT:  Negative      Respiratory:  Negative       Cardiovascular:  Negative      Gastroenterology: Negative      Genitourinary:   "Negative      Musculoskeletal:  Negative      Neurologic:  Negative      Psychiatric:  Negative      Heme/Lymph/Imm:  Negative      Endocrine:  Negative        Physical Exam:  Vitals: /90   Pulse 85   Ht 1.651 m (5' 5\")   Wt 83 kg (183 lb)   SpO2 99%   BMI 30.45 kg/m      Constitutional:  cooperative, alert and oriented, well developed, well nourished, in no acute distress overweight      Skin:  warm and dry to the touch, no apparent skin lesions or masses noted          Head:  normocephalic, no masses or lesions        Eyes:           Lymph:      ENT:  no pallor or cyanosis, dentition good        Neck:  carotid pulses are full and equal bilaterally        Respiratory:  normal breath sounds, clear to auscultation, normal A-P diameter, normal symmetry, normal respiratory excursion, no use of accessory muscles         Cardiac: regular rhythm       grade 2, RUSB, radiation to the carotid, systolic murmur     mid peaking  pulses full and equal                                        GI:           Extremities and Muscular Skeletal:  no edema, no spinal abnormalities noted, normal muscle strength and tone              Neurological:  no gross motor deficits        Psych:  affect appropriate, oriented to time, person and place        CC  Jb Ortiz MD  [address]  [place] [id number]                "

## 2025-04-01 NOTE — LETTER
4/1/2025    Alec Cardoso PA-C  [address]  [facility] [address]    RE: Mo Castaneda       Dear Colleague,     I had the pleasure of seeing Mo Castaneda in the [facility].  HPI and Plan:   Mo is a very nice 49-year-old woman with past medical history significant for a bicuspid aortic valve with aortic stenosis.  She has a son who, in June 2020, underwent surgery for his subvalvular membrane.  Mo had a TALHA in 2013 confirming her bicuspid aortic valve and again documenting that she has no subvalvular membrane.  She also had an MRI at that time, also demonstrating no evidence of subaortic membrane and her ascending aorta was 3.6 x 3.5.       Mo returns to clinic after a 2-year hiatus stating she is doing great.  .  She states she probably exercises about 2-1/2 hours a day.  She walks her dogs for an hour and a half, then oftentimes will do her own workout stairstepper for at least a half hour to an hour and she has no symptoms at all.  She states she never has any chest discomfort, lightheadedness or limitations with her activities.      Assessment and plan.   Mo has no symptoms at this time to suggest ischemia, heart failure or significant arrhythmia.  Clinically her aortic valve appears to be benign both by physical exam and clinical story.    Echo however continues to progress.  Ejection fraction is 65%.  Gradient across the valve is now 42 giving a valve area 1.0 and a dimensionless index of 0.32.  She has an elevated stroke-volume index at 56.     We talked about the importance of watching for chest discomfort, shortness of breath or exercise intolerance and or lightheadedness dizziness syncope or near syncope.    She is considering breast reduction surgery and I think she is okay to proceed as long as she  remains asymptomatic.    She asks about hormone replacement therapy as she is approaching menopause.  We discussed the literature on hormone replacement  therapy.  If she has no family history of breast cancer or hypercoagulable state and she is quite symptomatic as she goes through menopause it is not unreasonable to go on HRT for up to 8 years.     I will have her establish with Dr. Narinder Nagel in 6 months given that she has a bicuspid aortic valve and a son who also has congenital heart disease who will probably be graduating to [employer] in the next couple years.  If she should develop any symptoms  I have asked her to call us sooner.    Thank you for allow me to participate in this patient's care.  Sincerely,                               Jb Ortiz MD [facility]      Today's clinic visit entailed:  Review of the result(s) of each unique test - echocardiogram  Ordering of each unique test  Prescription drug management  30 minutes spent by me on the date of the encounter doing chart review, history and exam, documentation and further activities per the note  Provider  Link to [facility] Help Grid     The level of medical decision making during this visit was of moderate complexity.      Orders Placed This Encounter   Procedures     Follow-Up with Cardiology     Echocardiogram Complete       No orders of the defined types were placed in this encounter.      Medications Discontinued During This Encounter   Medication Reason     polyethylene glycol (GOLYTELY) 236 g suspension      ketoconazole (NIZORAL) 2 % external shampoo      bisacodyl (DULCOLAX) 5 MG EC tablet          Encounter Diagnoses   Name Primary?     Ascending aorta dilatation      Benign essential hypertension      Nonrheumatic aortic valve stenosis      Bicuspid aortic valve Yes       CURRENT MEDICATIONS:  Current Outpatient Medications   Medication Sig Dispense Refill     Cholecalciferol (VITAMIN D3 PO) Take 1,000 Units by mouth daily.       Multiple Vitamin (MULTIVITAMINS PO) Take 1 tablet by mouth daily.         ALLERGIES     Allergies   Allergen Reactions     No Known Drug Allergy        PAST MEDICAL  HISTORY:  Past Medical History:   Diagnosis Date     Aortic stenosis 2015     Aortic valve disorders     Bicuspid aortic valve     Ascending aorta dilatation      Congenital insufficiency of aortic valve-BICUSPID 2015      (normal spontaneous vaginal delivery)     x1     PIH (pregnancy induced hypertension)     delivered twins at 37 weeks       PAST SURGICAL HISTORY:  Past Surgical History:   Procedure Laterality Date     C/SECTION, CLASSICAL       COLONOSCOPY N/A 2023    Procedure: Colonoscopy;  Surgeon: Paul Elliott MD;  Location:  GI     ESOPHAGOSCOPY, GASTROSCOPY, DUODENOSCOPY (EGD), COMBINED N/A 2023    Procedure: Esophagoscopy, gastroscopy, duodenoscopy (EGD), combined;  Surgeon: Paul Elliott MD;  Location:  GI       FAMILY HISTORY:  Family History   Problem Relation Age of Onset     Lupus Father      Other - See Comments Father 63        gallbladder issues     Hypertension Father      Celiac Disease Brother      Other - See Comments Paternal Grandmother         MG     Thyroid Disease Paternal Grandmother      Heart Disease Paternal Grandfather         valve     Other - See Comments Paternal Grandfather         menieres     Diverticulitis Paternal Grandfather      Other - See Comments Paternal Aunt         RA     Rheumatoid Arthritis Paternal Aunt        SOCIAL HISTORY:  Social History     Socioeconomic History     Marital status:      Spouse name: None     Number of children: None     Years of education: None     Highest education level: None   Tobacco Use     Smoking status: Never     Smokeless tobacco: Never   Substance and Sexual Activity     Alcohol use: Yes     Comment: occasional     Drug use: Never   Other Topics Concern     Parent/sibling w/ CABG, MI or angioplasty before 65F 55M? No     Caffeine Concern No     Sleep Concern No     Special Diet No     Exercise Yes     Comment: treadmill daily, biking     Bike Helmet Yes     Seat Belt Yes   Social  "History Narrative    , has 9yo son and 8.4 yo twins     Homemaker.       Review of Systems:  Skin:  Negative       Eyes:  Negative      ENT:  Negative      Respiratory:  Negative       Cardiovascular:  Negative      Gastroenterology: Negative      Genitourinary:  Negative      Musculoskeletal:  Negative      Neurologic:  Negative      Psychiatric:  Negative      Heme/Lymph/Imm:  Negative      Endocrine:  Negative        Physical Exam:  Vitals: /90   Pulse 85   Ht 1.651 m (5' 5\")   Wt 83 kg (183 lb)   SpO2 99%   BMI 30.45 kg/m      Constitutional:  cooperative, alert and oriented, well developed, well nourished, in no acute distress overweight      Skin:  warm and dry to the touch, no apparent skin lesions or masses noted          Head:  normocephalic, no masses or lesions        Eyes:           Lymph:      ENT:  no pallor or cyanosis, dentition good        Neck:  carotid pulses are full and equal bilaterally        Respiratory:  normal breath sounds, clear to auscultation, normal A-P diameter, normal symmetry, normal respiratory excursion, no use of accessory muscles         Cardiac: regular rhythm       grade 2, RUSB, radiation to the carotid, systolic murmur     mid peaking  pulses full and equal                                        GI:           Extremities and Muscular Skeletal:  no edema, no spinal abnormalities noted, normal muscle strength and tone              Neurological:  no gross motor deficits        Psych:  affect appropriate, oriented to time, person and place        CC  Jb Ortiz MD  [address]  [place]  [place] [address]                  Thank you for allowing me to participate in the care of your patient.      Sincerely,     Jb Ortiz MD     [facility]  cc:   Jb Ortiz MD  [address]  [place] [id number]      "

## 2025-04-02 NOTE — TELEPHONE ENCOUNTER
Jb Ortiz MD Hiljus, Audrey G, RN11 hours ago (8:58 PM)     OK to go. No strenuous activities. No straining.     =============================================    Patient called and informed of Dr. Ortiz's above message and recommendations regarding upcoming [place] trip. Patient appreciates call and verbalizes understanding.

## 2025-06-15 ENCOUNTER — HEALTH MAINTENANCE LETTER (OUTPATIENT)
Age: 50
End: 2025-06-15

## 2025-07-15 ENCOUNTER — OFFICE VISIT (OUTPATIENT)
Dept: FAMILY MEDICINE | Facility: CLINIC | Age: 50
End: 2025-07-15
Payer: COMMERCIAL

## 2025-07-15 VITALS
RESPIRATION RATE: 14 BRPM | BODY MASS INDEX: 28.82 KG/M2 | DIASTOLIC BLOOD PRESSURE: 78 MMHG | SYSTOLIC BLOOD PRESSURE: 111 MMHG | WEIGHT: 173 LBS | OXYGEN SATURATION: 100 % | HEART RATE: 75 BPM | HEIGHT: 65 IN

## 2025-07-15 DIAGNOSIS — Z01.818 PRE-OP EXAM: Primary | ICD-10-CM

## 2025-07-15 DIAGNOSIS — Z01.84 IMMUNITY STATUS TESTING: ICD-10-CM

## 2025-07-15 DIAGNOSIS — N62 MACROMASTIA: ICD-10-CM

## 2025-07-15 DIAGNOSIS — I77.810 ASCENDING AORTA DILATATION: ICD-10-CM

## 2025-07-15 DIAGNOSIS — Q23.81 BICUSPID AORTIC VALVE: ICD-10-CM

## 2025-07-15 DIAGNOSIS — Z12.4 CERVICAL CANCER SCREENING: ICD-10-CM

## 2025-07-15 LAB
ANION GAP SERPL CALCULATED.3IONS-SCNC: 11 MMOL/L (ref 7–15)
BASOPHILS # BLD AUTO: 0 10E3/UL (ref 0–0.2)
BASOPHILS NFR BLD AUTO: 0 %
BUN SERPL-MCNC: 10.7 MG/DL (ref 6–20)
CALCIUM SERPL-MCNC: 9.6 MG/DL (ref 8.8–10.4)
CHLORIDE SERPL-SCNC: 103 MMOL/L (ref 98–107)
CREAT SERPL-MCNC: 0.79 MG/DL (ref 0.51–0.95)
EGFRCR SERPLBLD CKD-EPI 2021: 90 ML/MIN/1.73M2
EOSINOPHIL # BLD AUTO: 0.1 10E3/UL (ref 0–0.7)
EOSINOPHIL NFR BLD AUTO: 3 %
ERYTHROCYTE [DISTWIDTH] IN BLOOD BY AUTOMATED COUNT: 13 % (ref 10–15)
GLUCOSE SERPL-MCNC: 99 MG/DL (ref 70–99)
HBV SURFACE AB SERPL IA-ACNC: <3.5 M[IU]/ML
HBV SURFACE AB SERPL IA-ACNC: NONREACTIVE M[IU]/ML
HCO3 SERPL-SCNC: 26 MMOL/L (ref 22–29)
HCT VFR BLD AUTO: 37.7 % (ref 35–47)
HGB BLD-MCNC: 12.5 G/DL (ref 11.7–15.7)
IMM GRANULOCYTES # BLD: 0 10E3/UL
IMM GRANULOCYTES NFR BLD: 0 %
LYMPHOCYTES # BLD AUTO: 1.1 10E3/UL (ref 0.8–5.3)
LYMPHOCYTES NFR BLD AUTO: 32 %
MCH RBC QN AUTO: 30.2 PG (ref 26.5–33)
MCHC RBC AUTO-ENTMCNC: 33.2 G/DL (ref 31.5–36.5)
MCV RBC AUTO: 91 FL (ref 78–100)
MONOCYTES # BLD AUTO: 0.4 10E3/UL (ref 0–1.3)
MONOCYTES NFR BLD AUTO: 11 %
NEUTROPHILS # BLD AUTO: 1.9 10E3/UL (ref 1.6–8.3)
NEUTROPHILS NFR BLD AUTO: 54 %
PLATELET # BLD AUTO: 229 10E3/UL (ref 150–450)
POTASSIUM SERPL-SCNC: 4.4 MMOL/L (ref 3.4–5.3)
RBC # BLD AUTO: 4.14 10E6/UL (ref 3.8–5.2)
SODIUM SERPL-SCNC: 140 MMOL/L (ref 135–145)
WBC # BLD AUTO: 3.6 10E3/UL (ref 4–11)

## 2025-07-15 ASSESSMENT — PAIN SCALES - GENERAL: PAINLEVEL_OUTOF10: NO PAIN (0)

## 2025-07-15 NOTE — Clinical Note
Can we get pap records from [facility]? She just saw her OBGYN recently who said she is due next year

## 2025-07-15 NOTE — PROGRESS NOTES
Preoperative Evaluation  [facility]  [address] [facility], [address]  [facility] [address]  Phone: [phone]  Primary Provider: Alec Cardoso PA-C  Pre-op Performing Provider: Alec Cardoso PA-C  Jul 15, 2025             7/13/2025   Surgical Information   What procedure is being done? Breast reduction   Facility or Hospital where procedure/surgery will be performed: [facility]   Who is doing the procedure / surgery? Dr. Elsa Garcia   Date of surgery / procedure: August 8, 2025   Time of surgery / procedure: 10:15 am   Where do you plan to recover after surgery? at home with family     Fax number for surgical facility: Note does not need to be faxed, will be available electronically in Epic.    Assessment & Plan     The proposed surgical procedure is considered INTERMEDIATE risk.    Pre-op exam  Macromastia  Bicuspid aortic valve  Ascending aorta dilatation    Optimized for procedure pending labs.  Check CBC and BMP today.  Hold NSAIDs/vitamins/supplements 1 week prior to procedure.  EKG displays normal sinus rhythm with no acute ST or T wave changes.  Will send H&P, EKG, and labs to her surgical team when labs result.  She is comfortable with this plan.    - EKG 12-lead complete w/read - Clinics  - BASIC METABOLIC PANEL  - CBC with platelets and differential    Cervical cancer screening  Follows with OB/GYN.  Will obtain Pap records    Immunity status testing  Unsure on hep B immunizations.  Hep B antibody testing ordered.  - Hepatitis B Surface Antibody     - No identified additional risk factors other than previously addressed    Recommendation  Approval given to proceed with proposed procedure pending review of diagnostic evaluation.    The longitudinal plan of care for the diagnosis(es)/condition(s) as documented were addressed during this visit. Due to the added complexity in care, I will continue to support Mo in the subsequent management and with  ongoing continuity of care.    30 minutes spent by me on the date of the encounter doing chart review, history and exam, documentation and further activities per the note    Subjective   Mo is a very pleasant 50 year old, presenting for the following:  Pre-Op Exam (Breast Reduction )    Here for preoperative evaluation for upcoming breast reduction surgery scheduled for 8/8/2025.    No problems in the past with anesthesia.  No history of MI, CVA/TIA, DVT/PE.  Denies chest pain, shortness of breath, dyspnea on exertion, heart racing, or new leg swelling.    Followed by cardiology for history of bicuspid aortic valve/aortic valve stenosis in association with ascending aortic dilation.  Followed closely by cardiology.  Completed echo recently.  Will be establishing with new cardiologist in the fall is Dr. Ortiz is retiring.  Thankfully, she denies symptoms.    Has some component of whitecoat hypertension.  Blood pressure today at home 117/78.  Similar reading upon repeat here.        7/15/2025    10:41 AM   Additional Questions   Roomed by Elvira DRAKE             7/13/2025   Pre-Op Questionnaire   Have you ever had a heart attack or stroke? No   Have you ever had surgery on your heart or blood vessels, such as a stent placement, a coronary artery bypass, or surgery on an artery in your head, neck, heart, or legs? No   Do you have chest pain with activity? No   Do you have a history of heart failure? No   Do you currently have a cold, bronchitis or symptoms of other infection? No   Do you have a cough, shortness of breath, or wheezing? No   Do you or anyone in your family have previous history of blood clots? No   Do you or does anyone in your family have a serious bleeding problem such as prolonged bleeding following surgeries or cuts? No   Have you ever had problems with anemia or been told to take iron pills? No   Have you had any abnormal blood loss such as black, tarry or bloody stools, or abnormal vaginal  bleeding? No   Have you ever had a blood transfusion? No   Are you willing to have a blood transfusion if it is medically needed before, during, or after your surgery? Yes   Have you or any of your relatives ever had problems with anesthesia? No   Do you have sleep apnea, excessive snoring or daytime drowsiness? No   Do you have any artifical heart valves or other implanted medical devices like a pacemaker, defibrillator, or continuous glucose monitor? No   Do you have artificial joints? No   Are you allergic to latex? No     Advance Care Planning    Discussed advance care planning with patient; however, patient declined at this time.    Preoperative Review of    reviewed - no record of controlled substances prescribed.      Patient Active Problem List    Diagnosis Date Noted    Bicuspid aortic valve 2020     Priority: Medium    Ascending aorta dilatation      Priority: Medium    Benign essential hypertension 2017     Priority: Medium    Nonrheumatic aortic valve stenosis 2015     Priority: Medium    Aortic valve disorder      Priority: Medium     Bicuspid aortic valve  Problem list name updated by automated process. Provider to review        Past Medical History:   Diagnosis Date    Aortic stenosis 2015    Aortic valve disorders     Bicuspid aortic valve    Ascending aorta dilatation     Congenital insufficiency of aortic valve-BICUSPID 2015     (normal spontaneous vaginal delivery)     x1    PIH (pregnancy induced hypertension)     delivered twins at 37 weeks     Past Surgical History:   Procedure Laterality Date    ABDOMEN SURGERY  2005    C section    C/SECTION, CLASSICAL      COLONOSCOPY N/A 2023    Procedure: Colonoscopy;  Surgeon: Paul Elliott MD;  Location:  GI    ESOPHAGOSCOPY, GASTROSCOPY, DUODENOSCOPY (EGD), COMBINED N/A 2023    Procedure: Esophagoscopy, gastroscopy, duodenoscopy (EGD), combined;  Surgeon: Paul Elliott MD;   "Location:  GI     Current Outpatient Medications   Medication Sig Dispense Refill    Cholecalciferol (VITAMIN D3 PO) Take 1,000 Units by mouth daily.      Multiple Vitamin (MULTIVITAMINS PO) Take 1 tablet by mouth daily.         Allergies   Allergen Reactions    No Known Drug Allergy         Social History     Tobacco Use    Smoking status: Never     Passive exposure: Never    Smokeless tobacco: Never   Substance Use Topics    Alcohol use: Yes     Comment: occasional     Family History   Problem Relation Age of Onset    Lupus Father     Other - See Comments Father 63        gallbladder issues    Hypertension Father     Celiac Disease Brother     Other - See Comments Paternal Grandmother         MG    Thyroid Disease Paternal Grandmother     Heart Disease Paternal Grandfather         valve    Other - See Comments Paternal Grandfather         menieres    Diverticulitis Paternal Grandfather     Other - See Comments Paternal Aunt         RA    Rheumatoid Arthritis Paternal Aunt      History   Drug Use Unknown        Review of Systems  Constitutional, neuro, ENT, endocrine, pulmonary, cardiac, gastrointestinal, genitourinary, musculoskeletal, integument and psychiatric systems are negative, except as otherwise noted.    Objective    /78   Pulse 75   Resp 14   Ht 1.651 m (5' 5\")   Wt 78.5 kg (173 lb)   LMP 07/09/2025 (Approximate)   SpO2 100%   BMI 28.79 kg/m     Estimated body mass index is 28.79 kg/m  as calculated from the following:    Height as of this encounter: 1.651 m (5' 5\").    Weight as of this encounter: 78.5 kg (173 lb).  Physical Exam  GENERAL: alert and no distress  EYES: Eyes grossly normal to inspection, PERRL and conjunctivae and sclerae normal  NECK: no adenopathy, no asymmetry, masses, or scars  RESP: lungs clear to auscultation - no rales, rhonchi or wheezes  CV: regular rate and rhythm, stable holosystolic murmur best heard at right upper sternal border, no click or rub, no peripheral " "edema  MS: no gross musculoskeletal defects noted, no edema  SKIN: no suspicious lesions or rashes  NEURO: Normal strength and tone, mentation intact and speech normal  PSYCH: mentation appears normal, affect normal/bright    No results for input(s): \"HGB\", \"PLT\", \"INR\", \"NA\", \"POTASSIUM\", \"CR\", \"A1C\" in the last 8760 hours.     Diagnostics  Labs pending at this time.  Results will be reviewed when available.   EKG: appears normal, NSR, normal axis, normal intervals, no acute ST/T changes c/w ischemia, no LVH by voltage criteria, unchanged from previous tracings    Revised Cardiac Risk Index (RCRI)  The patient has the following serious cardiovascular risks for perioperative complications:   - No serious cardiac risks = 0 points     RCRI Interpretation: 0 points: Class I (very low risk - 0.4% complication rate)    Signed Electronically by: Alec Cardoso PA-C  A copy of this evaluation report is provided to the requesting physician.         "

## 2025-07-15 NOTE — Clinical Note
Please abstract the following data from this visit with this patient into the appropriate field in Epic:  Tests that can be patient reported without a hard copy:  Pap smear done on this date: 5/4/2023 by this group: [facility] Cobos,

## 2025-07-15 NOTE — PATIENT INSTRUCTIONS
-1 week prior to the procedure hold vitamins/supplements + NSAIDs (ibuprofen, aleve, advil, aspirin). Tylenol/acetaminophen is safe     -Do not eat anything after midnight  (geraldine of the surgery) and nothing the morning of the surgery.     -Follow all instructions given by the surgery team. They usually give out a packet. Read it and please follow it precisely. This helps surgical experience and outcomes.     -If you have any questions do not hesitate to call me or the surgeon/surgical team.

## 2025-08-08 ENCOUNTER — HOSPITAL ENCOUNTER (OUTPATIENT)
Facility: CLINIC | Age: 50
Discharge: HOME | End: 2025-08-08
Attending: PLASTIC SURGERY | Admitting: PLASTIC SURGERY
Payer: COMMERCIAL

## 2025-08-08 VITALS
OXYGEN SATURATION: 99 % | HEIGHT: 65 IN | DIASTOLIC BLOOD PRESSURE: 95 MMHG | SYSTOLIC BLOOD PRESSURE: 147 MMHG | HEART RATE: 60 BPM | WEIGHT: 173.6 LBS | BODY MASS INDEX: 28.92 KG/M2 | TEMPERATURE: 97 F | RESPIRATION RATE: 14 BRPM

## 2025-08-08 DIAGNOSIS — N62 HYPERTROPHY OF BREAST: Primary | ICD-10-CM

## 2025-08-08 LAB — HCG UR QL: NEGATIVE

## 2025-08-08 PROCEDURE — 272N000001 HC OR GENERAL SUPPLY STERILE: Performed by: PLASTIC SURGERY

## 2025-08-08 PROCEDURE — 250N000011 HC RX IP 250 OP 636: Performed by: ANESTHESIOLOGY

## 2025-08-08 PROCEDURE — 250N000011 HC RX IP 250 OP 636: Performed by: PLASTIC SURGERY

## 2025-08-08 PROCEDURE — 370N000017 HC ANESTHESIA TECHNICAL FEE, PER MIN: Performed by: PLASTIC SURGERY

## 2025-08-08 PROCEDURE — 360N000076 HC SURGERY LEVEL 3, PER MIN: Performed by: PLASTIC SURGERY

## 2025-08-08 PROCEDURE — 81025 URINE PREGNANCY TEST: CPT | Performed by: ANESTHESIOLOGY

## 2025-08-08 PROCEDURE — 250N000013 HC RX MED GY IP 250 OP 250 PS 637: Performed by: PLASTIC SURGERY

## 2025-08-08 PROCEDURE — 88305 TISSUE EXAM BY PATHOLOGIST: CPT | Mod: TC | Performed by: PLASTIC SURGERY

## 2025-08-08 PROCEDURE — 250N000009 HC RX 250: Performed by: PLASTIC SURGERY

## 2025-08-08 PROCEDURE — 710N000012 HC RECOVERY PHASE 2, PER MINUTE: Performed by: PLASTIC SURGERY

## 2025-08-08 PROCEDURE — 250N000013 HC RX MED GY IP 250 OP 250 PS 637: Performed by: ANESTHESIOLOGY

## 2025-08-08 PROCEDURE — 710N000009 HC RECOVERY PHASE 1, LEVEL 1, PER MIN: Performed by: PLASTIC SURGERY

## 2025-08-08 PROCEDURE — 999N000141 HC STATISTIC PRE-PROCEDURE NURSING ASSESSMENT: Performed by: PLASTIC SURGERY

## 2025-08-08 RX ORDER — HYDROCODONE BITARTRATE AND ACETAMINOPHEN 5; 325 MG/1; MG/1
1 TABLET ORAL EVERY 4 HOURS PRN
Qty: 12 TABLET | Refills: 0 | Status: SHIPPED | OUTPATIENT
Start: 2025-08-08 | End: 2025-08-11

## 2025-08-08 RX ORDER — LABETALOL HYDROCHLORIDE 5 MG/ML
10 INJECTION, SOLUTION INTRAVENOUS
Status: DISCONTINUED | OUTPATIENT
Start: 2025-08-08 | End: 2025-08-08 | Stop reason: HOSPADM

## 2025-08-08 RX ORDER — SODIUM CHLORIDE, SODIUM LACTATE, POTASSIUM CHLORIDE, CALCIUM CHLORIDE 600; 310; 30; 20 MG/100ML; MG/100ML; MG/100ML; MG/100ML
INJECTION, SOLUTION INTRAVENOUS CONTINUOUS
Status: DISCONTINUED | OUTPATIENT
Start: 2025-08-08 | End: 2025-08-08 | Stop reason: HOSPADM

## 2025-08-08 RX ORDER — APREPITANT 40 MG/1
40 CAPSULE ORAL ONCE
Status: COMPLETED | OUTPATIENT
Start: 2025-08-08 | End: 2025-08-08

## 2025-08-08 RX ORDER — HYDROMORPHONE HCL IN WATER/PF 6 MG/30 ML
0.4 PATIENT CONTROLLED ANALGESIA SYRINGE INTRAVENOUS EVERY 5 MIN PRN
Status: DISCONTINUED | OUTPATIENT
Start: 2025-08-08 | End: 2025-08-08 | Stop reason: HOSPADM

## 2025-08-08 RX ORDER — HYDROCODONE BITARTRATE AND ACETAMINOPHEN 5; 325 MG/1; MG/1
1 TABLET ORAL ONCE
Refills: 0 | Status: COMPLETED | OUTPATIENT
Start: 2025-08-08 | End: 2025-08-08

## 2025-08-08 RX ORDER — LIDOCAINE 40 MG/G
CREAM TOPICAL
Status: DISCONTINUED | OUTPATIENT
Start: 2025-08-08 | End: 2025-08-08 | Stop reason: HOSPADM

## 2025-08-08 RX ORDER — MAGNESIUM HYDROXIDE 1200 MG/15ML
LIQUID ORAL PRN
Status: DISCONTINUED | OUTPATIENT
Start: 2025-08-08 | End: 2025-08-08 | Stop reason: HOSPADM

## 2025-08-08 RX ORDER — NALOXONE HYDROCHLORIDE 0.4 MG/ML
0.1 INJECTION, SOLUTION INTRAMUSCULAR; INTRAVENOUS; SUBCUTANEOUS
Status: DISCONTINUED | OUTPATIENT
Start: 2025-08-08 | End: 2025-08-08 | Stop reason: HOSPADM

## 2025-08-08 RX ORDER — FENTANYL CITRATE 0.05 MG/ML
25 INJECTION, SOLUTION INTRAMUSCULAR; INTRAVENOUS EVERY 5 MIN PRN
Status: DISCONTINUED | OUTPATIENT
Start: 2025-08-08 | End: 2025-08-08 | Stop reason: HOSPADM

## 2025-08-08 RX ORDER — HYDRALAZINE HYDROCHLORIDE 20 MG/ML
2.5-5 INJECTION INTRAMUSCULAR; INTRAVENOUS EVERY 10 MIN PRN
Status: DISCONTINUED | OUTPATIENT
Start: 2025-08-08 | End: 2025-08-08 | Stop reason: HOSPADM

## 2025-08-08 RX ORDER — ONDANSETRON 4 MG/1
4 TABLET, ORALLY DISINTEGRATING ORAL EVERY 30 MIN PRN
Status: DISCONTINUED | OUTPATIENT
Start: 2025-08-08 | End: 2025-08-08 | Stop reason: HOSPADM

## 2025-08-08 RX ORDER — BUPIVACAINE HYDROCHLORIDE 2.5 MG/ML
INJECTION, SOLUTION INFILTRATION; PERINEURAL PRN
Status: DISCONTINUED | OUTPATIENT
Start: 2025-08-08 | End: 2025-08-08 | Stop reason: HOSPADM

## 2025-08-08 RX ORDER — ONDANSETRON 2 MG/ML
4 INJECTION INTRAMUSCULAR; INTRAVENOUS EVERY 30 MIN PRN
Status: DISCONTINUED | OUTPATIENT
Start: 2025-08-08 | End: 2025-08-08 | Stop reason: HOSPADM

## 2025-08-08 RX ORDER — FENTANYL CITRATE 0.05 MG/ML
50 INJECTION, SOLUTION INTRAMUSCULAR; INTRAVENOUS EVERY 5 MIN PRN
Status: DISCONTINUED | OUTPATIENT
Start: 2025-08-08 | End: 2025-08-08 | Stop reason: HOSPADM

## 2025-08-08 RX ORDER — ACETAMINOPHEN 500 MG
1000 TABLET ORAL ONCE
Status: COMPLETED | OUTPATIENT
Start: 2025-08-08 | End: 2025-08-08

## 2025-08-08 RX ORDER — CEFAZOLIN SODIUM/WATER 2 G/20 ML
2 SYRINGE (ML) INTRAVENOUS
Status: COMPLETED | OUTPATIENT
Start: 2025-08-08 | End: 2025-08-08

## 2025-08-08 RX ORDER — ACETAMINOPHEN 325 MG/1
975 TABLET ORAL ONCE
Status: DISCONTINUED | OUTPATIENT
Start: 2025-08-08 | End: 2025-08-08 | Stop reason: HOSPADM

## 2025-08-08 RX ORDER — HYDROXYZINE HYDROCHLORIDE 25 MG/1
25 TABLET, FILM COATED ORAL EVERY 6 HOURS PRN
Status: DISCONTINUED | OUTPATIENT
Start: 2025-08-08 | End: 2025-08-08 | Stop reason: HOSPADM

## 2025-08-08 RX ORDER — DEXAMETHASONE SODIUM PHOSPHATE 4 MG/ML
4 INJECTION, SOLUTION INTRA-ARTICULAR; INTRALESIONAL; INTRAMUSCULAR; INTRAVENOUS; SOFT TISSUE
Status: DISCONTINUED | OUTPATIENT
Start: 2025-08-08 | End: 2025-08-08 | Stop reason: HOSPADM

## 2025-08-08 RX ORDER — CEFAZOLIN SODIUM/WATER 2 G/20 ML
2 SYRINGE (ML) INTRAVENOUS SEE ADMIN INSTRUCTIONS
Status: DISCONTINUED | OUTPATIENT
Start: 2025-08-08 | End: 2025-08-08 | Stop reason: HOSPADM

## 2025-08-08 RX ORDER — HYDROMORPHONE HCL IN WATER/PF 6 MG/30 ML
0.2 PATIENT CONTROLLED ANALGESIA SYRINGE INTRAVENOUS EVERY 5 MIN PRN
Status: DISCONTINUED | OUTPATIENT
Start: 2025-08-08 | End: 2025-08-08 | Stop reason: HOSPADM

## 2025-08-08 RX ADMIN — HYDROCODONE BITARTRATE AND ACETAMINOPHEN 1 TABLET: 5; 325 TABLET ORAL at 12:23

## 2025-08-08 RX ADMIN — FENTANYL CITRATE 25 MCG: 50 INJECTION, SOLUTION INTRAMUSCULAR; INTRAVENOUS at 12:15

## 2025-08-08 RX ADMIN — APREPITANT 40 MG: 40 CAPSULE ORAL at 09:31

## 2025-08-08 RX ADMIN — ACETAMINOPHEN 1000 MG: 500 TABLET, FILM COATED ORAL at 09:19

## 2025-08-08 RX ADMIN — FENTANYL CITRATE 25 MCG: 50 INJECTION, SOLUTION INTRAMUSCULAR; INTRAVENOUS at 12:20

## 2025-08-08 ASSESSMENT — ACTIVITIES OF DAILY LIVING (ADL)
ADLS_ACUITY_SCORE: 41

## 2025-08-11 PROCEDURE — 88305 TISSUE EXAM BY PATHOLOGIST: CPT | Mod: 26 | Performed by: PATHOLOGY

## (undated) DEVICE — GLOVE PROTEXIS BLUE W/NEU-THERA 6.5  2D73EB65

## (undated) DEVICE — SOL NACL 0.9% IRRIG 1000ML BOTTLE 2F7124

## (undated) DEVICE — PREP CHLORAPREP 26ML TINTED HI-LITE ORANGE 930815

## (undated) DEVICE — LINEN TOWEL PACK X5 5464

## (undated) DEVICE — SU PDS II 2-0 CT-1 27" Z339H

## (undated) DEVICE — SU MONOCRYL 4-0 PS-2 18" UND Y496G

## (undated) DEVICE — BNDG ELASTIC 6" DBL LENGTH UNSTERILE 6611-16

## (undated) DEVICE — ESU ELEC BLADE HEX-LOCKING 2.5" E1450X

## (undated) DEVICE — DRSG KERLIX 4 1/2"X4YDS ROLL 6715

## (undated) DEVICE — ESU PENCIL W/HOLSTER E2350H

## (undated) DEVICE — SU MONOCRYL 5-0 P-3 18" UND Y493G

## (undated) DEVICE — SPONGE LAP 18X18" X8435

## (undated) DEVICE — DRAPE BREAST/CHEST 29420

## (undated) DEVICE — SOL WATER IRRIG 1000ML BOTTLE 2F7114

## (undated) DEVICE — SUCTION MANIFOLD NEPTUNE 2 SYS 4 PORT 0702-020-000

## (undated) DEVICE — SU MONOCRYL 3-0 PS-2 27" Y427H

## (undated) DEVICE — KIT TURNOVER FAIRVIEW SOUTHDALE FULL SP3889

## (undated) DEVICE — PAD CHUX UNDERPAD 23X24" 7136

## (undated) DEVICE — ESU ELEC BLADE 2.75" COATED/INSULATED E1455

## (undated) DEVICE — DRSG XEROFORM 5X9" 8884431605

## (undated) DEVICE — PACK MAJOR SBA15MAFSI

## (undated) DEVICE — BAG DECANTER STERILE WHITE DYNJDEC09

## (undated) DEVICE — STPL SKIN 35W ROTATING HEAD PRW35

## (undated) RX ORDER — APREPITANT 40 MG/1
CAPSULE ORAL
Status: DISPENSED
Start: 2025-08-08

## (undated) RX ORDER — CEFAZOLIN SODIUM/WATER 2 G/20 ML
SYRINGE (ML) INTRAVENOUS
Status: DISPENSED
Start: 2025-08-08

## (undated) RX ORDER — ONDANSETRON 2 MG/ML
INJECTION INTRAMUSCULAR; INTRAVENOUS
Status: DISPENSED
Start: 2025-08-08

## (undated) RX ORDER — PROPOFOL 10 MG/ML
INJECTION, EMULSION INTRAVENOUS
Status: DISPENSED
Start: 2025-08-08

## (undated) RX ORDER — HYDROCODONE BITARTRATE AND ACETAMINOPHEN 5; 325 MG/1; MG/1
TABLET ORAL
Status: DISPENSED
Start: 2025-08-08

## (undated) RX ORDER — LIDOCAINE HYDROCHLORIDE 10 MG/ML
INJECTION, SOLUTION EPIDURAL; INFILTRATION; INTRACAUDAL; PERINEURAL
Status: DISPENSED
Start: 2023-05-03

## (undated) RX ORDER — ACETAMINOPHEN 500 MG
TABLET ORAL
Status: DISPENSED
Start: 2025-08-08

## (undated) RX ORDER — DEXAMETHASONE SODIUM PHOSPHATE 4 MG/ML
INJECTION, SOLUTION INTRA-ARTICULAR; INTRALESIONAL; INTRAMUSCULAR; INTRAVENOUS; SOFT TISSUE
Status: DISPENSED
Start: 2025-08-08

## (undated) RX ORDER — FENTANYL CITRATE 50 UG/ML
INJECTION, SOLUTION INTRAMUSCULAR; INTRAVENOUS
Status: DISPENSED
Start: 2025-08-08

## (undated) RX ORDER — FENTANYL CITRATE 0.05 MG/ML
INJECTION, SOLUTION INTRAMUSCULAR; INTRAVENOUS
Status: DISPENSED
Start: 2025-08-08

## (undated) RX ORDER — FENTANYL CITRATE 50 UG/ML
INJECTION, SOLUTION INTRAMUSCULAR; INTRAVENOUS
Status: DISPENSED
Start: 2023-05-01